# Patient Record
Sex: FEMALE | Race: WHITE | Employment: OTHER | URBAN - METROPOLITAN AREA
[De-identification: names, ages, dates, MRNs, and addresses within clinical notes are randomized per-mention and may not be internally consistent; named-entity substitution may affect disease eponyms.]

---

## 2022-09-13 ENCOUNTER — OFFICE VISIT (OUTPATIENT)
Dept: ORTHOPEDIC SURGERY | Age: 82
End: 2022-09-13
Payer: MEDICARE

## 2022-09-13 VITALS — WEIGHT: 190 LBS | HEIGHT: 62 IN | BODY MASS INDEX: 34.96 KG/M2

## 2022-09-13 DIAGNOSIS — M17.12 OSTEOARTHRITIS OF LEFT KNEE, UNSPECIFIED OSTEOARTHRITIS TYPE: ICD-10-CM

## 2022-09-13 DIAGNOSIS — M25.562 LEFT KNEE PAIN, UNSPECIFIED CHRONICITY: ICD-10-CM

## 2022-09-13 DIAGNOSIS — M25.562 LEFT KNEE PAIN, UNSPECIFIED CHRONICITY: Primary | ICD-10-CM

## 2022-09-13 PROCEDURE — 1101F PT FALLS ASSESS-DOCD LE1/YR: CPT | Performed by: ORTHOPAEDIC SURGERY

## 2022-09-13 PROCEDURE — G8400 PT W/DXA NO RESULTS DOC: HCPCS | Performed by: ORTHOPAEDIC SURGERY

## 2022-09-13 PROCEDURE — G8432 DEP SCR NOT DOC, RNG: HCPCS | Performed by: ORTHOPAEDIC SURGERY

## 2022-09-13 PROCEDURE — 1123F ACP DISCUSS/DSCN MKR DOCD: CPT | Performed by: ORTHOPAEDIC SURGERY

## 2022-09-13 PROCEDURE — G8427 DOCREV CUR MEDS BY ELIG CLIN: HCPCS | Performed by: ORTHOPAEDIC SURGERY

## 2022-09-13 PROCEDURE — 1090F PRES/ABSN URINE INCON ASSESS: CPT | Performed by: ORTHOPAEDIC SURGERY

## 2022-09-13 PROCEDURE — G8417 CALC BMI ABV UP PARAM F/U: HCPCS | Performed by: ORTHOPAEDIC SURGERY

## 2022-09-13 PROCEDURE — G8536 NO DOC ELDER MAL SCRN: HCPCS | Performed by: ORTHOPAEDIC SURGERY

## 2022-09-13 PROCEDURE — 20611 DRAIN/INJ JOINT/BURSA W/US: CPT | Performed by: ORTHOPAEDIC SURGERY

## 2022-09-13 PROCEDURE — 99214 OFFICE O/P EST MOD 30 MIN: CPT | Performed by: ORTHOPAEDIC SURGERY

## 2022-09-13 RX ORDER — THYROID, PORCINE 30 MG/1
TABLET ORAL
COMMUNITY
Start: 2022-08-01

## 2022-09-13 RX ORDER — ALBUTEROL SULFATE 90 UG/1
AEROSOL, METERED RESPIRATORY (INHALATION)
COMMUNITY
Start: 2022-07-25

## 2022-09-13 RX ORDER — EZETIMIBE 10 MG/1
TABLET ORAL
COMMUNITY
Start: 2022-06-29

## 2022-09-13 RX ORDER — BENZONATATE 200 MG/1
CAPSULE ORAL
COMMUNITY
Start: 2022-07-25

## 2022-09-13 NOTE — LETTER
Georgiana Erin   1940   [id number]       9/13/2022       I hereby authorize and direct Pascual Velasquez MD, Deepti Conley, and whomever he may designate as his associate to perform upon myself the following procedure:    Injection of: 1st Orthovisc lt knee rm 7   If any unforeseen condition arises in the course of the procedure, I further authorize him and his associated and/or assistant(s) to do whatever he/she deems advisable. The nature, purpose, benefits, risks, side effects, likelihood of achieving goals, and potential problems that might occur during recuperation, risks for not receiving the proposed care, treatment and services and alternatives of the procedure have been fully explained to me by my physician including, but not limited to:    Swelling, joint pain, skin pigment changes, worsening of condition, and failure to improve. I acknowledge that no guarantee or assurance has been made to me as to the results that may be obtained or the likelihood of success.                 _______________________________________     Signature of patient or authorized representative                [employer] [facility] fax: [phone]

## 2022-09-13 NOTE — PATIENT INSTRUCTIONS
Knee Arthritis: Care Instructions  Your Care Instructions     Knee arthritis is a breakdown of the cartilage that cushions your knee joint. When the cartilage wears down, your bones rub against each other. This causes pain and stiffness. Knee arthritis tends to get worse with time. Treatment for knee arthritis involves reducing pain, making the leg muscles stronger, and staying at a healthy body weight. The treatment usually does not improve the health of the cartilage, but it can reduce pain and improve how well your knee works. You can take simple measures to protect your knee joints, ease your pain, and help you stay active. Follow-up care is a key part of your treatment and safety. Be sure to make and go to all appointments, and call your doctor if you are having problems. It's also a good idea to know your test results and keep a list of the medicines you take. How can you care for yourself at home? Know that knee arthritis will cause more pain on some days than on others. Stay at a healthy weight. Lose weight if you are overweight. When you stand up, the pressure on your knees from every pound of body weight is multiplied four times. So if you lose 10 pounds, you will reduce the pressure on your knees by 40 pounds. Talk to your doctor or physical therapist about exercises that will help ease joint pain. Stretch to help prevent stiffness and to prevent injury before you exercise. You may enjoy gentle forms of yoga to help keep your knee joints and muscles flexible. Walk instead of jog. Ride a bike. This makes your thigh muscles stronger and takes pressure off your knee. Wear well-fitting and comfortable shoes. Exercise in chest-deep water. This can help you exercise longer with less pain. Avoid exercises that include squatting or kneeling. They can put a lot of strain on your knees. Talk to your doctor to make sure that the exercise you do is not making the arthritis worse.   Do not sit for long periods of time. Try to walk once in a while to keep your knee from getting stiff. Ask your doctor or physical therapist whether shoe inserts may reduce your arthritis pain. If you can afford it, get new athletic shoes at least every year. This can help reduce the strain on your knees. Use a device to help you do everyday activities. A cane or walking stick can help you keep your balance when you walk. Hold the cane or walking stick in the hand opposite the painful knee. If you feel like you may fall when you walk, try using crutches or a front-wheeled walker. These can prevent falls that could cause more damage to your knee. A knee brace may help keep your knee stable and prevent pain. You also can use other things to make life easier, such as a higher toilet seat and handrails in the bathtub or shower. Take pain medicines exactly as directed. Do not wait until you are in severe pain. You will get better results if you take it sooner. If you are not taking a prescription pain medicine, take an over-the-counter medicine such as acetaminophen (Tylenol), ibuprofen (Advil, Motrin), or naproxen (Aleve). Read and follow all instructions on the label. Do not take two or more pain medicines at the same time unless the doctor told you to. Many pain medicines have acetaminophen, which is Tylenol. Too much acetaminophen (Tylenol) can be harmful. Tell your doctor if you take a blood thinner, have diabetes, or have allergies to shellfish. Ask your doctor if you might benefit from a shot of steroid medicine into your knee. This may provide pain relief for several months. Many people take the supplements glucosamine and chondroitin for osteoarthritis. Some people feel they help, but the medical research does not show that they work. Talk to your doctor before you take these supplements. When should you call for help?    Call your doctor now or seek immediate medical care if:    You have sudden swelling, warmth, or pain in your knee. You have knee pain and a fever or rash. You have such bad pain that you cannot use your knee. Watch closely for changes in your health, and be sure to contact your doctor if you have any problems. Where can you learn more? Go to http://www.gray.com/  Enter W187 in the search box to learn more about \"Knee Arthritis: Care Instructions. \"  Current as of: December 20, 2021               Content Version: 13.2  © 2006-2022 [employer]. Care instructions adapted under license by [employer] (which disclaims liability or warranty for this information). If you have questions about a medical condition or this instruction, always ask your healthcare professional. [address] any warranty or liability for your use of this information.

## 2022-09-13 NOTE — PROGRESS NOTES
Name: Tricia Fuentes    : 1940     Service Dept: [address] [place] [address] Sports Medicine    Chief Complaint   Patient presents with    Knee Pain        Visit Vitals  Ht 5' 2\" (1.575 m)   Wt 190 lb (86.2 kg)   BMI 34.75 kg/m²        Allergies   Allergen Reactions    Other Food Other (comments)     Celiac food allergies causes disease flare-up    Lactose Other (comments)     Celiac disease flare-up    No Known Drug Allergies Other (comments)        Current Outpatient Medications   Medication Sig Dispense Refill    albuterol (PROVENTIL HFA, VENTOLIN HFA, PROAIR HFA) 90 mcg/actuation inhaler       benzonatate (TESSALON) 200 mg capsule       ezetimibe (ZETIA) 10 mg tablet       Warren Thyroid 30 mg tablet       aspirin delayed-release 81 mg tablet Take 81 mg by mouth daily. magnesium 100 mg cap Take  by mouth. 4 tabs daily      cholecalciferol, VITAMIN D3, (VITAMIN D3) 5,000 unit tab tablet Take  by mouth daily. omega-3 fatty acids (FISH OIL) cap Take  by mouth. Fish oil 700-4 tabs daily      multivitamin (ONE A DAY) tablet Take 1 tablet by mouth daily. 6 tabs daily-physician prescribed      B.infantis-B.ani-B.long-B.bifi (PROBIOTIC 4X) 10-15 mg TbEC Take  by mouth. 4 tabs daily       Current Facility-Administered Medications   Medication Dose Route Frequency Provider Last Rate Last Admin    [COMPLETED] sodium hyaluronate (viscosup) (ORTHOVISC) 30 mg/2 mL injection syrg 30 mg  30 mg Intra artICUlar ONCE Eual Pascual Jeffers MD   30 mg at 22 1600      There is no problem list on file for this patient.      Family History   Problem Relation Age of Onset    Celiac Disease Mother     Celiac Disease Maternal Aunt     Celiac Disease Daughter     Celiac Disease Grandchild     Celiac Disease Maternal Aunt       Social History     Socioeconomic History    Marital status:    Tobacco Use    Smoking status: Never    Smokeless tobacco: Never   Substance and Sexual Activity    Alcohol use: No      Past Surgical History:   Procedure Laterality Date    HX APPENDECTOMY      HX GYN  1968    tubular pregnancy    HX OTHER SURGICAL  2009    colonscopy    [place] BREAST SURGERY PROCEDURE UNLISTED Right     benign lumpectomy      Past Medical History:   Diagnosis Date    Arthritis     Borderline diabetes mellitus     Celiac disease     Diverticulosis     History of diverticulitis of colon     Lactose intolerance     Thyroid disease     hypothyroid    Unspecified sleep apnea     cpap        I have reviewed and agree with [address] [facility] and ROS and intake form in chart and the record furthermore I have reviewed prior medical record(s) regarding this patients care during this appointment. Review of Systems:   Patient is a pleasant appearing individual, appropriately dressed, well hydrated, well nourished, who is alert, appropriately oriented for age, and in no acute distress with a normal gait and normal affect who does not appear to be in any significant pain. Physical Exam:  Left Knee -Decrease range of motion with flexion, Knee arc of greater than 50 degrees, Some crepitation, Grossly neurovascularly intact, Good cap refill, No skin lesion, Moderate swelling, some gross instability, Some quadriceps weakness, Kellgren and Mingo at least grade 3    Right Knee - Full Range of Motion, No crepitation, Grossly neurovascularly intact, Good cap refill, No skin lesion, No swelling, No gross instability, No quadriceps weakness     Procedure Documentation:    I discussed in detail the risks, benefits and complications of an injection which included but are not limited to infection, skin reactions, hot swollen joint, and anaphylaxis with the patient. The patient verbalized understanding and gave informed consent for the injection. The patient's left knee were flexed to 90° and the skin prepped using sterile alcohol solution.  A sterile needle was inserted into left knee and Orthovisc 15 mg, 2 mL syringe was injected under sterile technique. The needle was withdrawn and the puncture site sealed with a Band-Aid. Technique: Under sterile conditions a GE ultrasound unit with a variable frequency (7.0-14.0 MHz) linear transducer was used to localize the placement of needle into the left knee joint. Findings: Successful needle placement for knee injection. Final images were taken and saved for permanent record. The patient tolerated the injection well. The patient was instructed to call the office immediately if there is any pain, redness, warmth, fever, or chills. Encounter Diagnoses     ICD-10-CM ICD-9-CM   1. Left knee pain, unspecified chronicity  M25.562 719.46   2. Osteoarthritis of left knee, unspecified osteoarthritis type  M17.12 715.96       HPI:  The patient is here with a chief complaint of left knee pain, progressively getting worse. She has the most pain when doing a lot of walking. Patient has had cortisone injections in the past with no relief and has tried treatment with NSAIDs and activity modification. Pain is 5/10. X-rays done here today in the office are positive for severe OA, bone-on-bone arthritis. Assessment/Plan:  Plan will be for left knee replacement, general medical and cardiac clearance. No history of blood clots, does not take any blood thinners, possible inpatient stay, and we will do Orthovisc first one today in the meantime for some immediate relief and go from there. As part of continued conservative pain management options the patient was advised to utilize Tylenol or OTC NSAIDS as long as it is not medically contraindicated. Return to Office: Follow-up and Dispositions    Return for schedule for surgery.         Administrations This Visit       sodium hyaluronate (viscosup) (ORTHOVISC) 30 mg/2 mL injection syrg 30 mg       Admin Date  09/13/2022 Action  Given Dose  30 mg Route  Intra artICUlar Administered By  Shoaib Hess LPN                   Scribed by Keenan Alvarado Iona Schuster as dictated by [facility] ROMAINE Haley MD.  Documentation True and Accepted [facility] ROMAINE Haley MD

## 2022-09-20 ENCOUNTER — OFFICE VISIT (OUTPATIENT)
Dept: ORTHOPEDIC SURGERY | Age: 82
End: 2022-09-20
Payer: MEDICARE

## 2022-09-20 DIAGNOSIS — M25.562 LEFT KNEE PAIN, UNSPECIFIED CHRONICITY: Primary | ICD-10-CM

## 2022-09-20 DIAGNOSIS — M17.12 OSTEOARTHRITIS OF LEFT KNEE, UNSPECIFIED OSTEOARTHRITIS TYPE: ICD-10-CM

## 2022-09-20 PROCEDURE — 20611 DRAIN/INJ JOINT/BURSA W/US: CPT | Performed by: ORTHOPAEDIC SURGERY

## 2022-09-20 NOTE — PROGRESS NOTES
Name: Shorty Crump    : 1940     Service Dept: [address] [place] [address] and Sports Medicine    Chief Complaint   Patient presents with    Injection    Knee Pain        There were no vitals taken for this visit. Allergies   Allergen Reactions    Other Food Other (comments)     Celiac food allergies causes disease flare-up    Lactose Other (comments)     Celiac disease flare-up    No Known Drug Allergies Other (comments)        Current Outpatient Medications   Medication Sig Dispense Refill    albuterol (PROVENTIL HFA, VENTOLIN HFA, PROAIR HFA) 90 mcg/actuation inhaler       benzonatate (TESSALON) 200 mg capsule       ezetimibe (ZETIA) 10 mg tablet       Sod Thyroid 30 mg tablet       aspirin delayed-release 81 mg tablet Take 81 mg by mouth daily. magnesium 100 mg cap Take  by mouth. 4 tabs daily      cholecalciferol, VITAMIN D3, (VITAMIN D3) 5,000 unit tab tablet Take  by mouth daily. omega-3 fatty acids (FISH OIL) cap Take  by mouth. Fish oil 700-4 tabs daily      multivitamin (ONE A DAY) tablet Take 1 tablet by mouth daily. 6 tabs daily-physician prescribed      B.infantis-B.ani-B.long-B.bifi (PROBIOTIC 4X) 10-15 mg TbEC Take  by mouth. 4 tabs daily       Current Facility-Administered Medications   Medication Dose Route Frequency Provider Last Rate Last Admin    [COMPLETED] sodium hyaluronate (viscosup) (ORTHOVISC) 30 mg/2 mL injection syrg 30 mg  30 mg Intra artICUlar ONCE Pascual Anne MD   30 mg at 22 1600      There is no problem list on file for this patient.      Family History   Problem Relation Age of Onset    Celiac Disease Mother     Celiac Disease Maternal Aunt     Celiac Disease Daughter     Celiac Disease Grandchild     Celiac Disease Maternal Aunt       Social History     Socioeconomic History    Marital status:    Tobacco Use    Smoking status: Never    Smokeless tobacco: Never   Substance and Sexual Activity    Alcohol use: No      Past Surgical History:   Procedure Laterality Date    HX APPENDECTOMY      HX GYN  1968    tubular pregnancy    HX OTHER SURGICAL  2009    colonscopy    [place] BREAST SURGERY PROCEDURE UNLISTED Right     benign lumpectomy      Past Medical History:   Diagnosis Date    Arthritis     Borderline diabetes mellitus     Celiac disease     Diverticulosis     History of diverticulitis of colon     Lactose intolerance     Thyroid disease     hypothyroid    Unspecified sleep apnea     cpap        I have reviewed and agree with [address] [facility] and ROS and intake form in chart and the record furthermore I have reviewed prior medical record(s) regarding this patients care during this appointment. Review of Systems:   Patient is a pleasant appearing individual, appropriately dressed, well hydrated, well nourished, who is alert, appropriately oriented for age, and in no acute distress with a normal gait and normal affect who does not appear to be in any significant pain. Physical Exam:  Left Knee -Decrease range of motion with flexion, Knee arc of greater than 50 degrees, Some crepitation, Grossly neurovascularly intact, Good cap refill, No skin lesion, Moderate swelling, some gross instability, Some quadriceps weakness, Kellgren and Mingo at least grade 3    Right Knee - Full Range of Motion, No crepitation, Grossly neurovascularly intact, Good cap refill, No skin lesion, No swelling, No gross instability, No quadriceps weakness     Procedure Documentation:    I discussed in detail the risks, benefits and complications of an injection which included but are not limited to infection, skin reactions, hot swollen joint, and anaphylaxis with the patient. The patient verbalized understanding and gave informed consent for the injection. The patient's left knee were flexed to 90° and the skin prepped using sterile alcohol solution. A sterile needle was inserted into left knee and Orthovisc 15 mg, 2 mL syringe was injected under sterile technique. The needle was withdrawn and the puncture site sealed with a Band-Aid. Technique: Under sterile conditions a [employer] ultrasound unit with a variable frequency (7.0-14.0 MHz) linear transducer was used to localize the placement of needle into the left knee joint. Findings: Successful needle placement for knee injection. Final images were taken and saved for permanent record. The patient tolerated the injection well. The patient was instructed to call the office immediately if there is any pain, redness, warmth, fever, or chills. Encounter Diagnoses     ICD-10-CM ICD-9-CM   1. Left knee pain, unspecified chronicity  M25.562 719.46   2. Osteoarthritis of left knee, unspecified osteoarthritis type  M17.12 715.96       Assessment/Plan: We are going to go and inject the left knee with second Orthovisc. See the patient back in a week for third Orthovisc to the left knee. As part of continued conservative pain management options the patient was advised to utilize Tylenol or OTC NSAIDS as long as it is not medically contraindicated. Return to Office: Follow-up and Dispositions    Return in about 1 week (around 9/27/2022) for 3rd left knee orthovisc. Administrations This Visit       sodium hyaluronate (viscosup) (ORTHOVISC) 30 mg/2 mL injection syrg 30 mg       Admin Date  09/20/2022 Action  Given Dose  30 mg Route  Intra artICUlar Administered By  Ludin Jones LPN                   Scribed by Zoya Sharp LPN as dictated by [facility] ROMAINE Coleman MD.  Documentation True and Accepted Pascual ROMAINE Coleman MD

## 2022-09-20 NOTE — PATIENT INSTRUCTIONS
Knee Arthritis: Care Instructions  Your Care Instructions     Knee arthritis is a breakdown of the cartilage that cushions your knee joint. When the cartilage wears down, your bones rub against each other. This causes pain and stiffness. Knee arthritis tends to get worse with time. Treatment for knee arthritis involves reducing pain, making the leg muscles stronger, and staying at a healthy body weight. The treatment usually does not improve the health of the cartilage, but it can reduce pain and improve how well your knee works. You can take simple measures to protect your knee joints, ease your pain, and help you stay active. Follow-up care is a key part of your treatment and safety. Be sure to make and go to all appointments, and call your doctor if you are having problems. It's also a good idea to know your test results and keep a list of the medicines you take. How can you care for yourself at home? Know that knee arthritis will cause more pain on some days than on others. Stay at a healthy weight. Lose weight if you are overweight. When you stand up, the pressure on your knees from every pound of body weight is multiplied four times. So if you lose 10 pounds, you will reduce the pressure on your knees by 40 pounds. Talk to your doctor or physical therapist about exercises that will help ease joint pain. Stretch to help prevent stiffness and to prevent injury before you exercise. You may enjoy gentle forms of yoga to help keep your knee joints and muscles flexible. Walk instead of jog. Ride a bike. This makes your thigh muscles stronger and takes pressure off your knee. Wear well-fitting and comfortable shoes. Exercise in chest-deep water. This can help you exercise longer with less pain. Avoid exercises that include squatting or kneeling. They can put a lot of strain on your knees. Talk to your doctor to make sure that the exercise you do is not making the arthritis worse.   Do not sit for long periods of time. Try to walk once in a while to keep your knee from getting stiff. Ask your doctor or physical therapist whether shoe inserts may reduce your arthritis pain. If you can afford it, get new athletic shoes at least every year. This can help reduce the strain on your knees. Use a device to help you do everyday activities. A cane or walking stick can help you keep your balance when you walk. Hold the cane or walking stick in the hand opposite the painful knee. If you feel like you may fall when you walk, try using crutches or a front-wheeled walker. These can prevent falls that could cause more damage to your knee. A knee brace may help keep your knee stable and prevent pain. You also can use other things to make life easier, such as a higher toilet seat and handrails in the bathtub or shower. Take pain medicines exactly as directed. Do not wait until you are in severe pain. You will get better results if you take it sooner. If you are not taking a prescription pain medicine, take an over-the-counter medicine such as acetaminophen (Tylenol), ibuprofen (Advil, Motrin), or naproxen (Aleve). Read and follow all instructions on the label. Do not take two or more pain medicines at the same time unless the doctor told you to. Many pain medicines have acetaminophen, which is Tylenol. Too much acetaminophen (Tylenol) can be harmful. Tell your doctor if you take a blood thinner, have diabetes, or have allergies to shellfish. Ask your doctor if you might benefit from a shot of steroid medicine into your knee. This may provide pain relief for several months. Many people take the supplements glucosamine and chondroitin for osteoarthritis. Some people feel they help, but the medical research does not show that they work. Talk to your doctor before you take these supplements. When should you call for help?    Call your doctor now or seek immediate medical care if:    You have sudden swelling, warmth, or pain in your knee. You have knee pain and a fever or rash. You have such bad pain that you cannot use your knee. Watch closely for changes in your health, and be sure to contact your doctor if you have any problems. Where can you learn more? Go to http://www.gray.com/  Enter W187 in the search box to learn more about \"Knee Arthritis: Care Instructions. \"  Current as of: December 20, 2021               Content Version: 13.2  © 2006-2022 [employer]. Care instructions adapted under license by [employer] (which disclaims liability or warranty for this information). If you have questions about a medical condition or this instruction, always ask your healthcare professional. [address] any warranty or liability for your use of this information.

## 2022-09-20 NOTE — LETTER
Shawn Balloon   1940   [id number]       9/20/2022       I hereby authorize and direct Pascual Mejia MD, Carmen De La Cruz, and whomever he may designate as his associate to perform upon myself the following procedure:    Injection of: Orthovisc lt knee rm 1    If any unforeseen condition arises in the course of the procedure, I further authorize him and his associated and/or assistant(s) to do whatever he/she deems advisable. The nature, purpose, benefits, risks, side effects, likelihood of achieving goals, and potential problems that might occur during recuperation, risks for not receiving the proposed care, treatment and services and alternatives of the procedure have been fully explained to me by my physician including, but not limited to:    Swelling, joint pain, skin pigment changes, worsening of condition, and failure to improve. I acknowledge that no guarantee or assurance has been made to me as to the results that may be obtained or the likelihood of success.                 _______________________________________     Signature of patient or authorized representative                [employer] [facility] fax: [phone]

## 2022-09-27 ENCOUNTER — OFFICE VISIT (OUTPATIENT)
Dept: ORTHOPEDIC SURGERY | Age: 82
End: 2022-09-27
Payer: MEDICARE

## 2022-09-27 DIAGNOSIS — M25.562 LEFT KNEE PAIN, UNSPECIFIED CHRONICITY: ICD-10-CM

## 2022-09-27 DIAGNOSIS — M17.12 OSTEOARTHRITIS OF LEFT KNEE, UNSPECIFIED OSTEOARTHRITIS TYPE: Primary | ICD-10-CM

## 2022-09-27 PROCEDURE — 20611 DRAIN/INJ JOINT/BURSA W/US: CPT | Performed by: ORTHOPAEDIC SURGERY

## 2022-09-27 NOTE — PROGRESS NOTES
Name: Josselin     : 1940     Service Dept: [address] [place] [address] Sports Medicine    Chief Complaint   Patient presents with    Injection     Left Knee 3rd Orthovisc        There were no vitals taken for this visit. Allergies   Allergen Reactions    Other Food Other (comments)     Celiac food allergies causes disease flare-up    Lactose Other (comments)     Celiac disease flare-up    No Known Drug Allergies Other (comments)        Current Outpatient Medications   Medication Sig Dispense Refill    albuterol (PROVENTIL HFA, VENTOLIN HFA, PROAIR HFA) 90 mcg/actuation inhaler       benzonatate (TESSALON) 200 mg capsule       ezetimibe (ZETIA) 10 mg tablet       [place] Thyroid 30 mg tablet       aspirin delayed-release 81 mg tablet Take 81 mg by mouth daily. magnesium 100 mg cap Take  by mouth. 4 tabs daily      cholecalciferol, VITAMIN D3, (VITAMIN D3) 5,000 unit tab tablet Take  by mouth daily. omega-3 fatty acids (FISH OIL) cap Take  by mouth. Fish oil 700-4 tabs daily      multivitamin (ONE A DAY) tablet Take 1 tablet by mouth daily. 6 tabs daily-physician prescribed      B.infantis-B.ani-B.long-B.bifi (PROBIOTIC 4X) 10-15 mg TbEC Take  by mouth. 4 tabs daily       Current Facility-Administered Medications   Medication Dose Route Frequency Provider Last Rate Last Admin    [COMPLETED] sodium hyaluronate (viscosup) (ORTHOVISC) 30 mg/2 mL injection syrg 30 mg  30 mg Intra artICUlar ONCE Pascual Mcdonald MD   30 mg at 22 1600      There is no problem list on file for this patient. Family History   Problem Relation Age of Onset    Celiac Disease Mother     Celiac Disease Maternal Aunt     Celiac Disease Daughter     Celiac Disease Grandchild     Celiac Disease Maternal Aunt       Social History     Socioeconomic History    Marital status:    Tobacco Use    Smoking status: Never    Smokeless tobacco: Never   Substance and Sexual Activity    Alcohol use:  No Past Surgical History:   Procedure Laterality Date    HX APPENDECTOMY      HX GYN  1968    tubular pregnancy    HX OTHER SURGICAL  2009    colonscopy    [place] BREAST SURGERY PROCEDURE UNLISTED Right     benign lumpectomy      Past Medical History:   Diagnosis Date    Arthritis     Borderline diabetes mellitus     Celiac disease     Diverticulosis     History of diverticulitis of colon     Lactose intolerance     Thyroid disease     hypothyroid    Unspecified sleep apnea     cpap        I have reviewed and agree with [address] [facility] [address] and ROS and intake form in chart and the record furthermore I have reviewed prior medical record(s) regarding this patients care during this appointment. Review of Systems:   Patient is a pleasant appearing individual, appropriately dressed, well hydrated, well nourished, who is alert, appropriately oriented for age, and in no acute distress with a normal gait and normal affect who does not appear to be in any significant pain. Physical Exam:  Left Knee -Decrease range of motion with flexion, Knee arc of greater than 50 degrees, Some crepitation, Grossly neurovascularly intact, Good cap refill, No skin lesion, Moderate swelling, some gross instability, Some quadriceps weakness, Kellgren and Mingo at least grade 3    Right Knee - Full Range of Motion, No crepitation, Grossly neurovascularly intact, Good cap refill, No skin lesion, No swelling, No gross instability, No quadriceps weakness     Procedure Documentation:    I discussed in detail the risks, benefits and complications of an injection which included but are not limited to infection, skin reactions, hot swollen joint, and anaphylaxis with the patient. The patient verbalized understanding and gave informed consent for the injection. The patient's left knee were flexed to 90° and the skin prepped using sterile alcohol solution.  A sterile needle was inserted into left knee and Orthovisc 15 mg, 2 mL syringe was injected under sterile technique. The needle was withdrawn and the puncture site sealed with a Band-Aid. Technique: Under sterile conditions a [employer] ultrasound unit with a variable frequency (7.0-14.0 MHz) linear transducer was used to localize the placement of needle into the left knee joint. Findings: Successful needle placement for knee injection. Final images were taken and saved for permanent record. The patient tolerated the injection well. The patient was instructed to call the office immediately if there is any pain, redness, warmth, fever, or chills. Encounter Diagnoses     ICD-10-CM ICD-9-CM   1. Osteoarthritis of left knee, unspecified osteoarthritis type  M17.12 715.96   2. Left knee pain, unspecified chronicity  M25.562 719.46       Assessment/Plan: We are going to go and inject the left knee with third Orthovisc. See the patient back in a week for fourth Orthovisc to the left knee. As part of continued conservative pain management options the patient was advised to utilize Tylenol or OTC NSAIDS as long as it is not medically contraindicated. Return to Office: Follow-up and Dispositions    Return if symptoms worsen or fail to improve, for 4th left orthovisc. Administrations This Visit       sodium hyaluronate (viscosup) (ORTHOVISC) 30 mg/2 mL injection syrg 30 mg       Admin Date  09/27/2022 Action  Given Dose  30 mg Route  Intra artICUlar Administered By  Jennifer Brandon LPN                   Scribed by Beverly Nick LPN as dictated by [facility] [place] ROMAINE Brown MD.  Documentation True and Accepted Pascual Brown MD

## 2022-09-27 NOTE — PATIENT INSTRUCTIONS
Knee Arthritis: Care Instructions  Your Care Instructions     Knee arthritis is a breakdown of the cartilage that cushions your knee joint. When the cartilage wears down, your bones rub against each other. This causes pain and stiffness. Knee arthritis tends to get worse with time. Treatment for knee arthritis involves reducing pain, making the leg muscles stronger, and staying at a healthy body weight. The treatment usually does not improve the health of the cartilage, but it can reduce pain and improve how well your knee works. You can take simple measures to protect your knee joints, ease your pain, and help you stay active. Follow-up care is a key part of your treatment and safety. Be sure to make and go to all appointments, and call your doctor if you are having problems. It's also a good idea to know your test results and keep a list of the medicines you take. How can you care for yourself at home? Know that knee arthritis will cause more pain on some days than on others. Stay at a healthy weight. Lose weight if you are overweight. When you stand up, the pressure on your knees from every pound of body weight is multiplied four times. So if you lose 10 pounds, you will reduce the pressure on your knees by 40 pounds. Talk to your doctor or physical therapist about exercises that will help ease joint pain. Stretch to help prevent stiffness and to prevent injury before you exercise. You may enjoy gentle forms of yoga to help keep your knee joints and muscles flexible. Walk instead of jog. Ride a bike. This makes your thigh muscles stronger and takes pressure off your knee. Wear well-fitting and comfortable shoes. Exercise in chest-deep water. This can help you exercise longer with less pain. Avoid exercises that include squatting or kneeling. They can put a lot of strain on your knees. Talk to your doctor to make sure that the exercise you do is not making the arthritis worse.   Do not sit for long periods of time. Try to walk once in a while to keep your knee from getting stiff. Ask your doctor or physical therapist whether shoe inserts may reduce your arthritis pain. If you can afford it, get new athletic shoes at least every year. This can help reduce the strain on your knees. Use a device to help you do everyday activities. A cane or walking stick can help you keep your balance when you walk. Hold the cane or walking stick in the hand opposite the painful knee. If you feel like you may fall when you walk, try using crutches or a front-wheeled walker. These can prevent falls that could cause more damage to your knee. A knee brace may help keep your knee stable and prevent pain. You also can use other things to make life easier, such as a higher toilet seat and handrails in the bathtub or shower. Take pain medicines exactly as directed. Do not wait until you are in severe pain. You will get better results if you take it sooner. If you are not taking a prescription pain medicine, take an over-the-counter medicine such as acetaminophen (Tylenol), ibuprofen (Advil, Motrin), or naproxen (Aleve). Read and follow all instructions on the label. Do not take two or more pain medicines at the same time unless the doctor told you to. Many pain medicines have acetaminophen, which is Tylenol. Too much acetaminophen (Tylenol) can be harmful. Tell your doctor if you take a blood thinner, have diabetes, or have allergies to shellfish. Ask your doctor if you might benefit from a shot of steroid medicine into your knee. This may provide pain relief for several months. Many people take the supplements glucosamine and chondroitin for osteoarthritis. Some people feel they help, but the medical research does not show that they work. Talk to your doctor before you take these supplements. When should you call for help?    Call your doctor now or seek immediate medical care if:    You have sudden swelling, warmth, or pain in your knee. You have knee pain and a fever or rash. You have such bad pain that you cannot use your knee. Watch closely for changes in your health, and be sure to contact your doctor if you have any problems. Where can you learn more? Go to http://www.gray.com/  Enter W187 in the search box to learn more about \"Knee Arthritis: Care Instructions. \"  Current as of: December 20, 2021               Content Version: 13.2  © 2006-2022 [employer]. Care instructions adapted under license by [employer] (which disclaims liability or warranty for this information). If you have questions about a medical condition or this instruction, always ask your healthcare professional. [address] any warranty or liability for your use of this information.

## 2022-09-27 NOTE — LETTER
Joselynkarlane Check   1940   [id number]       9/27/2022       I hereby authorize and direct Pascual Duncan MD, Yecenia Slade, and whomever he may designate as his associate to perform upon myself the following procedure:    Injection of: Orthovisc LT KNEE    If any unforeseen condition arises in the course of the procedure, I further authorize him and his associated and/or assistant(s) to do whatever he/she deems advisable. The nature, purpose, benefits, risks, side effects, likelihood of achieving goals, and potential problems that might occur during recuperation, risks for not receiving the proposed care, treatment and services and alternatives of the procedure have been fully explained to me by my physician including, but not limited to:    Swelling, joint pain, skin pigment changes, worsening of condition, and failure to improve. I acknowledge that no guarantee or assurance has been made to me as to the results that may be obtained or the likelihood of success.                 _______________________________________     Signature of patient or authorized representative                [employer] [facility] fax: [phone]

## 2022-10-04 ENCOUNTER — OFFICE VISIT (OUTPATIENT)
Dept: ORTHOPEDIC SURGERY | Age: 82
End: 2022-10-04
Payer: MEDICARE

## 2022-10-04 DIAGNOSIS — M17.12 OSTEOARTHRITIS OF LEFT KNEE, UNSPECIFIED OSTEOARTHRITIS TYPE: Primary | ICD-10-CM

## 2022-10-04 DIAGNOSIS — M25.562 LEFT KNEE PAIN, UNSPECIFIED CHRONICITY: ICD-10-CM

## 2022-10-04 PROCEDURE — 20611 DRAIN/INJ JOINT/BURSA W/US: CPT | Performed by: ORTHOPAEDIC SURGERY

## 2022-10-04 NOTE — LETTER
Daniela Silvestre   1940   [id number]       10/4/2022       I hereby authorize and direct Pascual Price MD, Kaylyn Roblero, and whomever he may designate as his associate to perform upon myself the following procedure:    Injection of: 4th Orthovisc lt knee    If any unforeseen condition arises in the course of the procedure, I further authorize him and his associated and/or assistant(s) to do whatever he/she deems advisable. The nature, purpose, benefits, risks, side effects, likelihood of achieving goals, and potential problems that might occur during recuperation, risks for not receiving the proposed care, treatment and services and alternatives of the procedure have been fully explained to me by my physician including, but not limited to:    Swelling, joint pain, skin pigment changes, worsening of condition, and failure to improve. I acknowledge that no guarantee or assurance has been made to me as to the results that may be obtained or the likelihood of success.                 _______________________________________     Signature of patient or authorized representative                [employer] [facility] fax: [phone]

## 2022-10-04 NOTE — PATIENT INSTRUCTIONS
Knee Arthritis: Care Instructions  Your Care Instructions     Knee arthritis is a breakdown of the cartilage that cushions your knee joint. When the cartilage wears down, your bones rub against each other. This causes pain and stiffness. Knee arthritis tends to get worse with time. Treatment for knee arthritis involves reducing pain, making the leg muscles stronger, and staying at a healthy body weight. The treatment usually does not improve the health of the cartilage, but it can reduce pain and improve how well your knee works. You can take simple measures to protect your knee joints, ease your pain, and help you stay active. Follow-up care is a key part of your treatment and safety. Be sure to make and go to all appointments, and call your doctor if you are having problems. It's also a good idea to know your test results and keep a list of the medicines you take. How can you care for yourself at home? Know that knee arthritis will cause more pain on some days than on others. Stay at a healthy weight. Lose weight if you are overweight. When you stand up, the pressure on your knees from every pound of body weight is multiplied four times. So if you lose 10 pounds, you will reduce the pressure on your knees by 40 pounds. Talk to your doctor or physical therapist about exercises that will help ease joint pain. Stretch to help prevent stiffness and to prevent injury before you exercise. You may enjoy gentle forms of yoga to help keep your knee joints and muscles flexible. Walk instead of jog. Ride a bike. This makes your thigh muscles stronger and takes pressure off your knee. Wear well-fitting and comfortable shoes. Exercise in chest-deep water. This can help you exercise longer with less pain. Avoid exercises that include squatting or kneeling. They can put a lot of strain on your knees. Talk to your doctor to make sure that the exercise you do is not making the arthritis worse.   Do not sit for long periods of time. Try to walk once in a while to keep your knee from getting stiff. Ask your doctor or physical therapist whether shoe inserts may reduce your arthritis pain. If you can afford it, get new athletic shoes at least every year. This can help reduce the strain on your knees. Use a device to help you do everyday activities. A cane or walking stick can help you keep your balance when you walk. Hold the cane or walking stick in the hand opposite the painful knee. If you feel like you may fall when you walk, try using crutches or a front-wheeled walker. These can prevent falls that could cause more damage to your knee. A knee brace may help keep your knee stable and prevent pain. You also can use other things to make life easier, such as a higher toilet seat and handrails in the bathtub or shower. Take pain medicines exactly as directed. Do not wait until you are in severe pain. You will get better results if you take it sooner. If you are not taking a prescription pain medicine, take an over-the-counter medicine such as acetaminophen (Tylenol), ibuprofen (Advil, Motrin), or naproxen (Aleve). Read and follow all instructions on the label. Do not take two or more pain medicines at the same time unless the doctor told you to. Many pain medicines have acetaminophen, which is Tylenol. Too much acetaminophen (Tylenol) can be harmful. Tell your doctor if you take a blood thinner, have diabetes, or have allergies to shellfish. Ask your doctor if you might benefit from a shot of steroid medicine into your knee. This may provide pain relief for several months. Many people take the supplements glucosamine and chondroitin for osteoarthritis. Some people feel they help, but the medical research does not show that they work. Talk to your doctor before you take these supplements. When should you call for help?    Call your doctor now or seek immediate medical care if:    You have sudden swelling, warmth, or pain in your knee. You have knee pain and a fever or rash. You have such bad pain that you cannot use your knee. Watch closely for changes in your health, and be sure to contact your doctor if you have any problems. Where can you learn more? Go to http://www.gray.com/  Enter W187 in the search box to learn more about \"Knee Arthritis: Care Instructions. \"  Current as of: December 20, 2021               Content Version: 13.2  © 2006-2022 [employer]. Care instructions adapted under license by [employer] (which disclaims liability or warranty for this information). If you have questions about a medical condition or this instruction, always ask your healthcare professional. [address] any warranty or liability for your use of this information.

## 2022-10-04 NOTE — PROGRESS NOTES
Name: Augie Suazo    : 1940     Service Dept: [address] [place] [address] Medicine    Chief Complaint   Patient presents with    Injection     4th Orthovisc        There were no vitals taken for this visit. Allergies   Allergen Reactions    Other Food Other (comments)     Celiac food allergies causes disease flare-up    Lactose Other (comments)     Celiac disease flare-up    No Known Drug Allergies Other (comments)        Current Outpatient Medications   Medication Sig Dispense Refill    albuterol (PROVENTIL HFA, VENTOLIN HFA, PROAIR HFA) 90 mcg/actuation inhaler       benzonatate (TESSALON) 200 mg capsule       ezetimibe (ZETIA) 10 mg tablet       [place] Thyroid 30 mg tablet       aspirin delayed-release 81 mg tablet Take 81 mg by mouth daily. magnesium 100 mg cap Take  by mouth. 4 tabs daily      cholecalciferol, VITAMIN D3, (VITAMIN D3) 5,000 unit tab tablet Take  by mouth daily. omega-3 fatty acids (FISH OIL) cap Take  by mouth. Fish oil 700-4 tabs daily      multivitamin (ONE A DAY) tablet Take 1 tablet by mouth daily. 6 tabs daily-physician prescribed      B.infantis-B.ani-B.long-B.bifi (PROBIOTIC 4X) 10-15 mg TbEC Take  by mouth. 4 tabs daily       Current Facility-Administered Medications   Medication Dose Route Frequency Provider Last Rate Last Admin    [COMPLETED] sodium hyaluronate (viscosup) (ORTHOVISC) 30 mg/2 mL injection syrg 30 mg  30 mg Intra artICUlar ONCE Pascual Stokes MD   30 mg at 10/04/22 1600      There is no problem list on file for this patient.      Family History   Problem Relation Age of Onset    Celiac Disease Mother     Celiac Disease Maternal Aunt     Celiac Disease Daughter     Celiac Disease Grandchild     Celiac Disease Maternal Aunt       Social History     Socioeconomic History    Marital status:    Tobacco Use    Smoking status: Never    Smokeless tobacco: Never   Substance and Sexual Activity    Alcohol use: No      Past Surgical History:   Procedure Laterality Date    HX APPENDECTOMY      HX GYN  1968    tubular pregnancy    HX OTHER SURGICAL  2009    colonscopy    [place] BREAST SURGERY PROCEDURE UNLISTED Right     benign lumpectomy      Past Medical History:   Diagnosis Date    Arthritis     Borderline diabetes mellitus     Celiac disease     Diverticulosis     History of diverticulitis of colon     Lactose intolerance     Thyroid disease     hypothyroid    Unspecified sleep apnea     cpap        I have reviewed and agree with [address] [facility] [address] and ROS and intake form in chart and the record furthermore I have reviewed prior medical record(s) regarding this patients care during this appointment. Review of Systems:   Patient is a pleasant appearing individual, appropriately dressed, well hydrated, well nourished, who is alert, appropriately oriented for age, and in no acute distress with a normal gait and normal affect who does not appear to be in any significant pain. Physical Exam:  Left Knee -Decrease range of motion with flexion, Knee arc of greater than 50 degrees, Some crepitation, Grossly neurovascularly intact, Good cap refill, No skin lesion, Moderate swelling, some gross instability, Some quadriceps weakness, Kellgren and Mingo at least grade 3    Right Knee - Full Range of Motion, No crepitation, Grossly neurovascularly intact, Good cap refill, No skin lesion, No swelling, No gross instability, No quadriceps weakness     Procedure Documentation:    I discussed in detail the risks, benefits and complications of an injection which included but are not limited to infection, skin reactions, hot swollen joint, and anaphylaxis with the patient. The patient verbalized understanding and gave informed consent for the injection. The patient's left knee were flexed to 90° and the skin prepped using sterile alcohol solution. A sterile needle was inserted into left knee and Orthovisc 15 mg, 2 mL syringe was injected under sterile technique. The needle was withdrawn and the puncture site sealed with a Band-Aid. Technique: Under sterile conditions a [employer] ultrasound unit with a variable frequency (7.0-14.0 MHz) linear transducer was used to localize the placement of needle into the left knee joint. Findings: Successful needle placement for knee injection. Final images were taken and saved for permanent record. The patient tolerated the injection well. The patient was instructed to call the office immediately if there is any pain, redness, warmth, fever, or chills. Encounter Diagnoses     ICD-10-CM ICD-9-CM   1. Osteoarthritis of left knee, unspecified osteoarthritis type  M17.12 715.96   2. Left knee pain, unspecified chronicity  M25.562 719.46       HPI:  The patient is here with a chief complaint of left knee pain. Assessment/Plan: We are going to go ahead and inject left knee with fourth Orthovisc. See the patient back as needed and go from there. As part of continued conservative pain management options the patient was advised to utilize Tylenol or OTC NSAIDS as long as it is not medically contraindicated. Return to Office: Follow-up and Dispositions    Return if symptoms worsen or fail to improve. Administrations This Visit       sodium hyaluronate (viscosup) (ORTHOVISC) 30 mg/2 mL injection syrg 30 mg       Admin Date  10/04/2022 Action  Given Dose  30 mg Route  Intra artICUlar Administered By  Willy Spencer LPN                   Scribed by Lee Mobley LPN as dictated by [facility] [place] ROMAINE Galo MD.  Documentation True and Accepted Pascual Galo MD

## 2023-01-24 ENCOUNTER — HOSPITAL ENCOUNTER (OUTPATIENT)
Dept: LAB | Age: 83
Discharge: HOME | End: 2023-01-24
Payer: MEDICARE

## 2023-01-24 ENCOUNTER — HOSPITAL ENCOUNTER (OUTPATIENT)
Dept: GENERAL RADIOLOGY | Age: 83
End: 2023-01-24
Payer: MEDICARE

## 2023-01-24 ENCOUNTER — HOSPITAL ENCOUNTER (OUTPATIENT)
Dept: NON INVASIVE DIAGNOSTICS | Age: 83
End: 2023-01-24
Payer: MEDICARE

## 2023-01-24 DIAGNOSIS — M25.562 LEFT KNEE PAIN, UNSPECIFIED CHRONICITY: ICD-10-CM

## 2023-01-24 DIAGNOSIS — M17.12 OSTEOARTHRITIS OF LEFT KNEE, UNSPECIFIED OSTEOARTHRITIS TYPE: ICD-10-CM

## 2023-01-24 LAB
ANION GAP SERPL CALC-SCNC: 7 MMOL/L (ref 5–15)
BASOPHILS # BLD: 0.1 K/UL (ref 0–0.1)
BASOPHILS NFR BLD: 1 % (ref 0–1)
BUN SERPL-MCNC: 24 MG/DL (ref 6–20)
BUN/CREAT SERPL: 24 (ref 12–20)
CA-I BLD-MCNC: 9.1 MG/DL (ref 8.5–10.1)
CHLORIDE SERPL-SCNC: 107 MMOL/L (ref 97–108)
CO2 SERPL-SCNC: 24 MMOL/L (ref 21–32)
CREAT SERPL-MCNC: 1.01 MG/DL (ref 0.55–1.02)
DIFFERENTIAL METHOD BLD: NORMAL
EOSINOPHIL # BLD: 0.1 K/UL (ref 0–0.4)
EOSINOPHIL NFR BLD: 2 % (ref 0–7)
ERYTHROCYTE [DISTWIDTH] IN BLOOD BY AUTOMATED COUNT: 14 % (ref 11.5–14.5)
GLUCOSE SERPL-MCNC: 119 MG/DL (ref 65–100)
HCT VFR BLD AUTO: 41.6 % (ref 35–47)
HGB BLD-MCNC: 13.9 G/DL (ref 11.5–16)
IMM GRANULOCYTES # BLD AUTO: 0 K/UL (ref 0–0.04)
IMM GRANULOCYTES NFR BLD AUTO: 0 % (ref 0–0.5)
LYMPHOCYTES # BLD: 2.4 K/UL (ref 0.8–3.5)
LYMPHOCYTES NFR BLD: 35 % (ref 12–49)
MCH RBC QN AUTO: 31.4 PG (ref 26–34)
MCHC RBC AUTO-ENTMCNC: 33.4 G/DL (ref 30–36.5)
MCV RBC AUTO: 93.9 FL (ref 80–99)
MONOCYTES # BLD: 0.7 K/UL (ref 0–1)
MONOCYTES NFR BLD: 10 % (ref 5–13)
MRSA DNA SPEC QL NAA+PROBE: NOT DETECTED
NEUTS SEG # BLD: 3.6 K/UL (ref 1.8–8)
NEUTS SEG NFR BLD: 52 % (ref 32–75)
NRBC # BLD: 0 K/UL (ref 0–0.01)
NRBC BLD-RTO: 0 PER 100 WBC
PLATELET # BLD AUTO: 193 K/UL (ref 150–400)
PMV BLD AUTO: 10.7 FL (ref 8.9–12.9)
POTASSIUM SERPL-SCNC: 4.2 MMOL/L (ref 3.5–5.1)
RBC # BLD AUTO: 4.43 M/UL (ref 3.8–5.2)
SODIUM SERPL-SCNC: 138 MMOL/L (ref 136–145)
WBC # BLD AUTO: 6.9 K/UL (ref 3.6–11)

## 2023-01-24 PROCEDURE — 85025 COMPLETE CBC W/AUTO DIFF WBC: CPT

## 2023-01-24 PROCEDURE — 93005 ELECTROCARDIOGRAM TRACING: CPT

## 2023-01-24 PROCEDURE — 71046 X-RAY EXAM CHEST 2 VIEWS: CPT

## 2023-01-24 PROCEDURE — 36415 COLL VENOUS BLD VENIPUNCTURE: CPT

## 2023-01-24 PROCEDURE — 80048 BASIC METABOLIC PNL TOTAL CA: CPT

## 2023-01-24 PROCEDURE — 87641 MR-STAPH DNA AMP PROBE: CPT

## 2023-01-25 LAB
ATRIAL RATE: 82 BPM
CALCULATED P AXIS, ECG09: 57 DEGREES
CALCULATED R AXIS, ECG10: 13 DEGREES
CALCULATED T AXIS, ECG11: 43 DEGREES
DIAGNOSIS, 93000: NORMAL
P-R INTERVAL, ECG05: 172 MS
Q-T INTERVAL, ECG07: 374 MS
QRS DURATION, ECG06: 70 MS
QTC CALCULATION (BEZET), ECG08: 436 MS
VENTRICULAR RATE, ECG03: 82 BPM

## 2023-03-20 RX ORDER — MAGNESIUM OXIDE 400 MG/1
400 TABLET ORAL DAILY
COMMUNITY

## 2023-03-20 RX ORDER — VITAMIN B COMPLEX
1 TABLET ORAL DAILY
COMMUNITY

## 2023-03-20 RX ORDER — ZINC GLUCONATE 50 MG
50 TABLET ORAL DAILY
COMMUNITY

## 2023-03-27 DIAGNOSIS — M17.12 UNILATERAL PRIMARY OSTEOARTHRITIS, LEFT KNEE: ICD-10-CM

## 2023-03-27 DIAGNOSIS — M25.562 LEFT KNEE PAIN, UNSPECIFIED CHRONICITY: Primary | ICD-10-CM

## 2023-04-05 DIAGNOSIS — Z96.652 STATUS POST TOTAL LEFT KNEE REPLACEMENT: Primary | ICD-10-CM

## 2023-04-06 ENCOUNTER — OFFICE VISIT (OUTPATIENT)
Age: 83
End: 2023-04-06

## 2023-04-06 VITALS — BODY MASS INDEX: 35.7 KG/M2 | HEIGHT: 62 IN | WEIGHT: 194 LBS

## 2023-04-06 DIAGNOSIS — M25.562 LEFT KNEE PAIN, UNSPECIFIED CHRONICITY: ICD-10-CM

## 2023-04-06 DIAGNOSIS — M17.12 UNILATERAL PRIMARY OSTEOARTHRITIS, LEFT KNEE: Primary | ICD-10-CM

## 2023-04-06 RX ORDER — ALBUTEROL SULFATE 90 UG/1
AEROSOL, METERED RESPIRATORY (INHALATION)
COMMUNITY

## 2023-04-06 RX ORDER — DOXYCYCLINE HYCLATE 100 MG/1
CAPSULE ORAL
COMMUNITY

## 2023-04-06 RX ORDER — BENZONATATE 200 MG/1
CAPSULE ORAL
COMMUNITY

## 2023-04-06 RX ORDER — ONDANSETRON 8 MG/1
8 TABLET, ORALLY DISINTEGRATING ORAL EVERY 8 HOURS PRN
Qty: 20 TABLET | Refills: 0 | Status: SHIPPED | OUTPATIENT
Start: 2023-04-06 | End: 2023-04-13

## 2023-04-06 RX ORDER — OXYCODONE HYDROCHLORIDE AND ACETAMINOPHEN 5; 325 MG/1; MG/1
1 TABLET ORAL
Qty: 30 TABLET | Refills: 0 | Status: SHIPPED | OUTPATIENT
Start: 2023-04-06 | End: 2023-04-14

## 2023-04-06 RX ORDER — CEPHALEXIN 500 MG/1
500 CAPSULE ORAL EVERY 8 HOURS
Qty: 9 CAPSULE | Refills: 0 | Status: SHIPPED | OUTPATIENT
Start: 2023-04-06 | End: 2023-04-09

## 2023-04-06 NOTE — PROGRESS NOTES
SURGERY. Scribed by Evelin Nava LPN as dictated by [facility] NAVIN Guzmán MD.  Documentation, performed by, True and Accepted Kalia Guzmán MD

## 2023-04-06 NOTE — PATIENT INSTRUCTIONS
You have knee pain and a fever or rash. You have such bad pain that you cannot use your knee. Watch closely for changes in your health, and be sure to contact your doctor if you have any problems. Where can you learn more? Go to http://www.woods.com/ and enter W187 to learn more about \"Knee Arthritis: Care Instructions. \"  Current as of: March 9, 2022               Content Version: 13.5  © 2006-2022 [employer]. Care instructions adapted under license by [facility] [address] If you have questions about a medical condition or this instruction, always ask your healthcare professional. [place] [address] any warranty or liability for your use of this information.

## 2023-04-13 ENCOUNTER — APPOINTMENT (OUTPATIENT)
Age: 83
End: 2023-04-13
Attending: ORTHOPAEDIC SURGERY
Payer: MEDICARE

## 2023-04-13 ENCOUNTER — HOSPITAL ENCOUNTER (OUTPATIENT)
Age: 83
Setting detail: OBSERVATION
Discharge: HOME | End: 2023-04-14
Attending: ORTHOPAEDIC SURGERY | Admitting: INTERNAL MEDICINE
Payer: MEDICARE

## 2023-04-13 PROBLEM — Z96.652 TOTAL KNEE REPLACEMENT STATUS, LEFT: Status: ACTIVE | Noted: 2023-04-13

## 2023-04-13 PROCEDURE — 7100000001 HC PACU RECOVERY - ADDTL 15 MIN: Performed by: ORTHOPAEDIC SURGERY

## 2023-04-13 PROCEDURE — 2720000010 HC SURG SUPPLY STERILE: Performed by: ORTHOPAEDIC SURGERY

## 2023-04-13 PROCEDURE — 6360000002 HC RX W HCPCS: Performed by: NURSE PRACTITIONER

## 2023-04-13 PROCEDURE — 7100000011 HC PHASE II RECOVERY - ADDTL 15 MIN: Performed by: ORTHOPAEDIC SURGERY

## 2023-04-13 PROCEDURE — 3600000005 HC SURGERY LEVEL 5 BASE: Performed by: ORTHOPAEDIC SURGERY

## 2023-04-13 PROCEDURE — 7100000000 HC PACU RECOVERY - FIRST 15 MIN: Performed by: ORTHOPAEDIC SURGERY

## 2023-04-13 PROCEDURE — 7100000010 HC PHASE II RECOVERY - FIRST 15 MIN: Performed by: ORTHOPAEDIC SURGERY

## 2023-04-13 PROCEDURE — 96375 TX/PRO/DX INJ NEW DRUG ADDON: CPT

## 2023-04-13 PROCEDURE — 6370000000 HC RX 637 (ALT 250 FOR IP): Performed by: NURSE PRACTITIONER

## 2023-04-13 PROCEDURE — 2580000003 HC RX 258: Performed by: NURSE PRACTITIONER

## 2023-04-13 PROCEDURE — 97116 GAIT TRAINING THERAPY: CPT

## 2023-04-13 PROCEDURE — 6370000000 HC RX 637 (ALT 250 FOR IP): Performed by: NURSE ANESTHETIST, CERTIFIED REGISTERED

## 2023-04-13 PROCEDURE — C1776 JOINT DEVICE (IMPLANTABLE): HCPCS | Performed by: ORTHOPAEDIC SURGERY

## 2023-04-13 PROCEDURE — 2580000003 HC RX 258: Performed by: ORTHOPAEDIC SURGERY

## 2023-04-13 PROCEDURE — 3600000015 HC SURGERY LEVEL 5 ADDTL 15MIN: Performed by: ORTHOPAEDIC SURGERY

## 2023-04-13 PROCEDURE — 97162 PT EVAL MOD COMPLEX 30 MIN: CPT

## 2023-04-13 PROCEDURE — C1713 ANCHOR/SCREW BN/BN,TIS/BN: HCPCS | Performed by: ORTHOPAEDIC SURGERY

## 2023-04-13 PROCEDURE — 2709999900 HC NON-CHARGEABLE SUPPLY: Performed by: ORTHOPAEDIC SURGERY

## 2023-04-13 PROCEDURE — 3700000000 HC ANESTHESIA ATTENDED CARE: Performed by: ORTHOPAEDIC SURGERY

## 2023-04-13 PROCEDURE — G0378 HOSPITAL OBSERVATION PER HR: HCPCS

## 2023-04-13 PROCEDURE — 3700000001 HC ADD 15 MINUTES (ANESTHESIA): Performed by: ORTHOPAEDIC SURGERY

## 2023-04-13 PROCEDURE — 73560 X-RAY EXAM OF KNEE 1 OR 2: CPT

## 2023-04-13 PROCEDURE — 2500000003 HC RX 250 WO HCPCS: Performed by: ORTHOPAEDIC SURGERY

## 2023-04-13 DEVICE — KNEE K1 TOT HEMI STD CEM IMPL CAPPED SYNTHES: Type: IMPLANTABLE DEVICE | Site: KNEE | Status: FUNCTIONAL

## 2023-04-13 DEVICE — CEMENT BONE 40 GM W/ GENTMYCN HI VISC PALACOS R+G: Type: IMPLANTABLE DEVICE | Site: KNEE | Status: FUNCTIONAL

## 2023-04-13 DEVICE — ATTUNE KNEE SYSTEM FEMORAL POSTERIOR STABILIZED NARROW SIZE 5N LEFT CEMENTED
Type: IMPLANTABLE DEVICE | Site: KNEE | Status: FUNCTIONAL
Brand: ATTUNE

## 2023-04-13 DEVICE — ATTUNE KNEE SYSTEM TIBIAL INSERT ROTATING PLATFORM POSTERIOR STABILIZED 5 15MM AOX
Type: IMPLANTABLE DEVICE | Site: KNEE | Status: FUNCTIONAL
Brand: ATTUNE

## 2023-04-13 DEVICE — ATTUNE PATELLA MEDIALIZED DOME 38MM CEMENTED AOX
Type: IMPLANTABLE DEVICE | Site: KNEE | Status: FUNCTIONAL
Brand: ATTUNE

## 2023-04-13 DEVICE — ATTUNE KNEE SYSTEM TIBIAL BASE ROTATING PLATFORM SIZE 4 CEMENTED
Type: IMPLANTABLE DEVICE | Site: KNEE | Status: FUNCTIONAL
Brand: ATTUNE

## 2023-04-13 RX ORDER — SODIUM CHLORIDE 0.9 % (FLUSH) 0.9 %
5-40 SYRINGE (ML) INJECTION PRN
Status: DISCONTINUED | OUTPATIENT
Start: 2023-04-13 | End: 2023-04-14 | Stop reason: HOSPADM

## 2023-04-13 RX ORDER — GABAPENTIN 300 MG/1
300 CAPSULE ORAL ONCE
Status: COMPLETED | OUTPATIENT
Start: 2023-04-13 | End: 2023-04-13

## 2023-04-13 RX ORDER — PROCHLORPERAZINE EDISYLATE 5 MG/ML
5 INJECTION INTRAMUSCULAR; INTRAVENOUS
Status: DISCONTINUED | OUTPATIENT
Start: 2023-04-13 | End: 2023-04-13

## 2023-04-13 RX ORDER — SODIUM CHLORIDE 0.9 % (FLUSH) 0.9 %
5-40 SYRINGE (ML) INJECTION EVERY 12 HOURS SCHEDULED
Status: DISCONTINUED | OUTPATIENT
Start: 2023-04-13 | End: 2023-04-14 | Stop reason: HOSPADM

## 2023-04-13 RX ORDER — KETOROLAC TROMETHAMINE 30 MG/ML
15 INJECTION, SOLUTION INTRAMUSCULAR; INTRAVENOUS EVERY 6 HOURS
Status: DISCONTINUED | OUTPATIENT
Start: 2023-04-13 | End: 2023-04-13

## 2023-04-13 RX ORDER — OXYCODONE HYDROCHLORIDE 5 MG/1
5 TABLET ORAL EVERY 4 HOURS PRN
Status: DISCONTINUED | OUTPATIENT
Start: 2023-04-13 | End: 2023-04-14 | Stop reason: HOSPADM

## 2023-04-13 RX ORDER — MIDAZOLAM HYDROCHLORIDE 1 MG/ML
2 INJECTION INTRAMUSCULAR; INTRAVENOUS
Status: DISCONTINUED | OUTPATIENT
Start: 2023-04-13 | End: 2023-04-13

## 2023-04-13 RX ORDER — HALOPERIDOL 5 MG/ML
1 INJECTION INTRAMUSCULAR
Status: DISCONTINUED | OUTPATIENT
Start: 2023-04-13 | End: 2023-04-13

## 2023-04-13 RX ORDER — SODIUM CHLORIDE, SODIUM LACTATE, POTASSIUM CHLORIDE, CALCIUM CHLORIDE 600; 310; 30; 20 MG/100ML; MG/100ML; MG/100ML; MG/100ML
INJECTION, SOLUTION INTRAVENOUS CONTINUOUS
Status: DISCONTINUED | OUTPATIENT
Start: 2023-04-13 | End: 2023-04-13

## 2023-04-13 RX ORDER — FENTANYL CITRATE 50 UG/ML
50 INJECTION, SOLUTION INTRAMUSCULAR; INTRAVENOUS EVERY 5 MIN PRN
Status: DISCONTINUED | OUTPATIENT
Start: 2023-04-13 | End: 2023-04-13

## 2023-04-13 RX ORDER — DIPHENHYDRAMINE HYDROCHLORIDE 50 MG/ML
25 INJECTION INTRAMUSCULAR; INTRAVENOUS EVERY 6 HOURS PRN
Status: DISCONTINUED | OUTPATIENT
Start: 2023-04-13 | End: 2023-04-14 | Stop reason: HOSPADM

## 2023-04-13 RX ORDER — OXYCODONE HYDROCHLORIDE 10 MG/1
10 TABLET ORAL EVERY 4 HOURS PRN
Status: DISCONTINUED | OUTPATIENT
Start: 2023-04-13 | End: 2023-04-14 | Stop reason: HOSPADM

## 2023-04-13 RX ORDER — BUPIVACAINE HYDROCHLORIDE AND EPINEPHRINE 2.5; 5 MG/ML; UG/ML
INJECTION, SOLUTION INFILTRATION; PERINEURAL PRN
Status: DISCONTINUED | OUTPATIENT
Start: 2023-04-13 | End: 2023-04-13 | Stop reason: ALTCHOICE

## 2023-04-13 RX ORDER — DIPHENHYDRAMINE HYDROCHLORIDE 50 MG/ML
12.5 INJECTION INTRAMUSCULAR; INTRAVENOUS
Status: DISCONTINUED | OUTPATIENT
Start: 2023-04-13 | End: 2023-04-13

## 2023-04-13 RX ORDER — CELECOXIB 200 MG/1
200 CAPSULE ORAL ONCE
Status: COMPLETED | OUTPATIENT
Start: 2023-04-13 | End: 2023-04-13

## 2023-04-13 RX ORDER — SODIUM CHLORIDE 0.9 % (FLUSH) 0.9 %
5-40 SYRINGE (ML) INJECTION EVERY 12 HOURS SCHEDULED
Status: DISCONTINUED | OUTPATIENT
Start: 2023-04-13 | End: 2023-04-13

## 2023-04-13 RX ORDER — ACETAMINOPHEN 325 MG/1
650 TABLET ORAL EVERY 6 HOURS
Status: DISCONTINUED | OUTPATIENT
Start: 2023-04-13 | End: 2023-04-14 | Stop reason: HOSPADM

## 2023-04-13 RX ORDER — ONDANSETRON 2 MG/ML
4 INJECTION INTRAMUSCULAR; INTRAVENOUS EVERY 6 HOURS PRN
Status: DISCONTINUED | OUTPATIENT
Start: 2023-04-13 | End: 2023-04-14 | Stop reason: HOSPADM

## 2023-04-13 RX ORDER — KETOROLAC TROMETHAMINE 30 MG/ML
15 INJECTION, SOLUTION INTRAMUSCULAR; INTRAVENOUS EVERY 6 HOURS
Status: COMPLETED | OUTPATIENT
Start: 2023-04-13 | End: 2023-04-14

## 2023-04-13 RX ORDER — SODIUM CHLORIDE, SODIUM LACTATE, POTASSIUM CHLORIDE, CALCIUM CHLORIDE 600; 310; 30; 20 MG/100ML; MG/100ML; MG/100ML; MG/100ML
INJECTION, SOLUTION INTRAVENOUS CONTINUOUS
Status: DISCONTINUED | OUTPATIENT
Start: 2023-04-13 | End: 2023-04-14 | Stop reason: HOSPADM

## 2023-04-13 RX ORDER — DIPHENHYDRAMINE HCL 25 MG
25 TABLET ORAL EVERY 6 HOURS PRN
Status: DISCONTINUED | OUTPATIENT
Start: 2023-04-13 | End: 2023-04-14 | Stop reason: HOSPADM

## 2023-04-13 RX ORDER — LABETALOL HYDROCHLORIDE 5 MG/ML
10 INJECTION, SOLUTION INTRAVENOUS
Status: DISCONTINUED | OUTPATIENT
Start: 2023-04-13 | End: 2023-04-13

## 2023-04-13 RX ORDER — ONDANSETRON 4 MG/1
4 TABLET, ORALLY DISINTEGRATING ORAL EVERY 8 HOURS PRN
Status: DISCONTINUED | OUTPATIENT
Start: 2023-04-13 | End: 2023-04-14 | Stop reason: HOSPADM

## 2023-04-13 RX ORDER — ACETAMINOPHEN 500 MG
1000 TABLET ORAL ONCE
Status: COMPLETED | OUTPATIENT
Start: 2023-04-13 | End: 2023-04-13

## 2023-04-13 RX ORDER — SODIUM CHLORIDE 0.9 % (FLUSH) 0.9 %
5-40 SYRINGE (ML) INJECTION PRN
Status: DISCONTINUED | OUTPATIENT
Start: 2023-04-13 | End: 2023-04-13

## 2023-04-13 RX ADMIN — GABAPENTIN 300 MG: 300 CAPSULE ORAL at 07:28

## 2023-04-13 RX ADMIN — ACETAMINOPHEN 650 MG: 325 TABLET ORAL at 13:11

## 2023-04-13 RX ADMIN — KETOROLAC TROMETHAMINE 15 MG: 30 INJECTION, SOLUTION INTRAMUSCULAR; INTRAVENOUS at 21:37

## 2023-04-13 RX ADMIN — ACETAMINOPHEN 1000 MG: 500 TABLET ORAL at 07:28

## 2023-04-13 RX ADMIN — OXYCODONE HYDROCHLORIDE 10 MG: 10 TABLET ORAL at 19:59

## 2023-04-13 RX ADMIN — ACETAMINOPHEN 650 MG: 325 TABLET ORAL at 18:12

## 2023-04-13 RX ADMIN — Medication 10 ML: at 21:38

## 2023-04-13 RX ADMIN — OXYCODONE HYDROCHLORIDE 10 MG: 10 TABLET ORAL at 15:37

## 2023-04-13 RX ADMIN — KETOROLAC TROMETHAMINE 15 MG: 30 INJECTION, SOLUTION INTRAMUSCULAR; INTRAVENOUS at 14:42

## 2023-04-13 RX ADMIN — CEFAZOLIN 2000 MG: 2 INJECTION, POWDER, FOR SOLUTION INTRAMUSCULAR; INTRAVENOUS at 18:12

## 2023-04-13 RX ADMIN — CELECOXIB 200 MG: 200 CAPSULE ORAL at 07:28

## 2023-04-13 RX ADMIN — SODIUM CHLORIDE, POTASSIUM CHLORIDE, SODIUM LACTATE AND CALCIUM CHLORIDE: 600; 310; 30; 20 INJECTION, SOLUTION INTRAVENOUS at 07:29

## 2023-04-13 ASSESSMENT — PAIN - FUNCTIONAL ASSESSMENT: PAIN_FUNCTIONAL_ASSESSMENT: NONE - DENIES PAIN

## 2023-04-13 ASSESSMENT — PAIN DESCRIPTION - LOCATION
LOCATION: KNEE

## 2023-04-13 ASSESSMENT — PAIN SCALES - GENERAL
PAINLEVEL_OUTOF10: 7
PAINLEVEL_OUTOF10: 7
PAINLEVEL_OUTOF10: 6

## 2023-04-13 ASSESSMENT — PAIN DESCRIPTION - ORIENTATION
ORIENTATION: LEFT
ORIENTATION: LEFT

## 2023-04-13 ASSESSMENT — PAIN DESCRIPTION - DESCRIPTORS: DESCRIPTORS: ACHING;OTHER (COMMENT)

## 2023-04-13 NOTE — THERAPY EVALUATION
PHYSICAL THERAPY EVALUATION    Patient: Braxton Moreno (34 y.o. female)  Date: 4/13/2023  Physical Therapy Time:   PT Individual Minutes  Time In: 4899  Time Out: 1147  Minutes: 45  Timed Minute Breakdown:  PTE: 30       GT: 15       Primary Diagnosis: Primary osteoarthritis of left knee [M17.12]  Total knee replacement status, left [Z96.652] Total knee replacement status, left  Present on Admission:   Total knee replacement status, left   Procedure(s) (LRB):  LEFT TKA (OBSERVATION) (Left) Day of Surgery   Precautions:    Restrictions/Precautions  Restrictions/Precautions: Weight Bearing Lower Extremity Weight Bearing Restrictions  Left Lower Extremity Weight Bearing: Weight Bearing As Tolerated    Discharge Recommendations: Outpatient PT    Conditions Requiring skilled therapeutic intervention:    L TKA    Evaluation Activity Tolerance:   Activity Tolerance  Activity Tolerance: Patient tolerated evaluation without incident;Patient limited by endurance    Equipment Recommendations for Discharge:   na    AM-PAC   24; Current research shows that an AM-PAC score of 17 or less is typically not associated with a discharge to the patient's home setting, whereas a score of 18 or greater is typically associated with a discharge to the patient's home setting. Factors which may influence rehabilitation potential include:  No factors noted     PLAN :   DC home with outpatient PT     SUBJECTIVE:     See below    OBJECTIVE DATA SUMMARY:     Past Medical History:   Diagnosis Date    Hyperlipidemia      Past Surgical History:   Procedure Laterality Date    ECTOPIC PREGNANCY SURGERY Right     JOINT REPLACEMENT Right     Rt. TKA    TOTAL KNEE ARTHROPLASTY Left 4/13/2023    LEFT TKA (OBSERVATION) performed by Rhett Ambrose MD at [facility] MAIN OR       Home Situation:  Social/Functional History  Lives With: Spouse  Type of Home: House    Vitals:   WNL    Orientation and Cognition:   Orientation  Overall Orientation Status: Within

## 2023-04-13 NOTE — DISCHARGE INSTRUCTIONS
TOTAL KNEE REPLACEMENT DISCHARGE INFORMATION    You have undergone a Total Knee Replacement. The following list is to provide you with some expectations over the next week upon your discharge from the hospital.     Please begin Aspirin 325mg every 12 hours (twice daily) starting tomorrow as directed until Dr. Yuliya Barone instructs you to discontinue it. If you are not sure which blood thinner to take please contact Dr. Alana Livingston office next business day for clarification. Please be sure to continue your thigh-high compression stockings on both sides until instructed to discontinue them. Over the course of the next week, you should continue thigh high stockings on the operative leg, DO NOT GET THE INCISION WET until instructed to do so. Please make sure the stockings on the operative leg are pulled up all the way to the thigh to prevent any creases which may result in abrasions or creases in the skin. If the stockings are creating creases resulting in abrasions or blistering on the operative leg please remove the stockings. You may take the stockings off on the nonoperative leg once you arrive home. You may notice some bruising on your thigh and it may extend all the way to the ankles. That is perfectly normal early on. You may experience a clicking noise in your knee and that is normal because of the artificial knee. It is important to remember if you have any surgical procedure including dental procedures which may result in bleeding that an antibiotic 1 hour before the procedure will be required. Please let the provider performing the procedure know that you have artificial joint. If an antibiotic is not given by them please call our office and give us at least 5 business days to get you the appropriate antibiotics if needed. This rule applies indefinitely. If an Ace wrap is placed on your knee you may remove the Ace wrap only 48 hours after your surgery. We will leave the stockings on.   During the course

## 2023-04-13 NOTE — PROGRESS NOTES
1220- Assumed care of pt. Received report from [facility]. Pt VSS and admission assessment completed. Pt requesting lunch tray and cup of ice water. Pt instructed on use of call bell. 1250- Pt set up with lunch tray. Pt has no current requests. Family at bedside. 1311- Scheduled tylenol given. Pt tolerated well with small sips of water. Pt pedal pulses and swelling assessed. Pedal pulses strong and equal bilaterally +3. Pt has minimal edema in legs, non pitting. Pt awaiting PT. Call bell left within reach. 1400- PT in to see patient. Pt tolerated ambulation well. Expressed small amount of dizziness near the end of ambulation, but no drop in blood pressure. Pt ambulated to the toilet with the physical therapist during the session and urinated. Pt back in bed resting quietly with the call bell in reach and family at bedside. 1442- Scheduled Toradol given. Pt IV flushed before and after medication administration. Pt educated on upcoming dose of IV antibiotic this evening. Pt has no complaints or requests at this time. 1545- Pt calling the nurses station complaining of pain and requesting some pain medication. Pt urinated on herself and stated that she needed to urinate more in the restroom. Pt ambulated to the restroom well and urinated in the toilet. Pt redressed into dry clothing. Bedding changed. Pt ambulated back to bed and has no complaints at this time. Call bell left within reach, family at bedside. 1608- Pt voices relief and states that she feels \"much more comfortable\". Pt attempting to rest.     1717- Pt resting in the room with family. Pt expresses relief but states that her leg still \"feels a little achy\". Pt notified of time she can receive the next prn medication and verbalizes understanding. Pt trying to rest. Call bell left within reach. 1730- Pt ambulating in the jones with family. 1740- Pt back in bed resting quietly. 1812- IV ancef started. Scheduled tylenol given.

## 2023-04-13 NOTE — INTERVAL H&P NOTE
Update History & Physical    The Patient's History and Physical was reviewed with the patient. The patient was examined. There was no change. The surgical site was confirmed by the patient and me. Patient understands and wants to proceed with the procedure. If applicable, I have discussed with the patient / power of  the rationale for blood component transfusion; its benefits in treating or preventing fatigue, organ damage, or death; and its risk which includes mild transfusion reactions, rare risk of blood borne infection, or more serious but rare reactions. I have discussed the alternatives to transfusion, including the risk and consequences of not receiving transfusion. The patient / Jesus Michele of  had an opportunity to ask questions and had agreed to proceed with transfusion of blood components. Plan:  The risk, benefits, expected outcome, and alternative to the recommended procedure have been discussed with the patient.

## 2023-04-13 NOTE — H&P
History and Physical    Subjective:     Darlyn Lui is a [de-identified] y.o. with a past medical history for osteoarthritis, hyperlipidemia, and obstructive sleep apnea with CPAP use, patient presented to the facility today to undergo a left total knee arthroplasty with Dr. Yuliya Barone. Hospital medicine consulted for medication management and overnight observation. Patient assessed at the bedside, patient is alert and oriented x3, there are no acute signs and symptoms of distress. Patient denies chest pain, shortness of breath, nausea, vomiting, diarrhea. Patient at this time has border line low BP, but she asymptomatic denies dizziness and headaches. Patient agrees to admission for overnight observation, with plans to discharge home in the morning. Admit patient to medical surgical unit for observation. Past Medical History:   Diagnosis Date    Hyperlipidemia       Past Surgical History:   Procedure Laterality Date    ECTOPIC PREGNANCY SURGERY Right     JOINT REPLACEMENT Right     Rt. TKA     History reviewed. No pertinent family history. Social History     Tobacco Use    Smoking status: Never    Smokeless tobacco: Never   Substance Use Topics    Alcohol use: Not on file       Prior to Admission medications    Medication Sig Start Date End Date Taking?  Authorizing Provider   zinc gluconate 50 MG tablet Take 1 tablet by mouth daily   Yes Historical Provider, MD   Probiotic Product (PROBIOTIC ADVANCED PO) Take 1 tablet by mouth daily   Yes Historical Provider, MD   magnesium oxide (MAG-OX) 400 MG tablet Take 1 tablet by mouth daily   Yes Historical Provider, MD   GARLIC PO Take 1 tablet by mouth daily   Yes Historical Provider, MD   Coenzyme Q10 (COQ10) 100 MG CAPS Take 1 tablet by mouth daily   Yes Historical Provider, MD   albuterol sulfate HFA (PROVENTIL;VENTOLIN;PROAIR) 108 (90 Base) MCG/ACT inhaler albuterol sulfate HFA 90 mcg/actuation aerosol inhaler    Historical Provider, MD   benzonatate (TESSALON) 200

## 2023-04-13 NOTE — PROGRESS NOTES
Nutrition Note    RDN discussed dietary needs with patient 2/2 Gluten intolerance. Reviewed the menu, probed for knowledge gaps, and provided information to the kitchen as to what she can and cannot receive with her intolerance. Author left contact in case questions related to gluten free diet come up.     Electronically signed by Nigel Lopez on 4/13/23 at 3:51 PM EDT    Contact: [phone]

## 2023-04-13 NOTE — OP NOTE
[id number] Device identifier type: GS1      [employer] Information       Request status Waiting for response                As of 4/13/2023       Status: Implanted                      Baseplate Tib Sz 4 Knee Rot Platfrm Prashant Sys Attune - [id number] - Implanted   (Left) Knee      Inventory item: BASEPLATE TIB SZ 4 KNEE ROT PLATFRM PRASHANT SYS ATTUNE Model/Cat number: [id number]    : [employer]- Lot number: [id number]    Device identifier: [id number] Device identifier type: GS1      GUDID Information       Request status Waiting for response                As of 4/13/2023       Status: Implanted                      Component Pat Kpu54vi Polyeth Dome Prashant Medialized Attune - [id number] - Implanted   (Left) Knee      Inventory item: COMPONENT PAT [id number] POLYETH DOME PRASHANT MEDIALIZED ATTUNE Model/Cat number: [id number]    : [employer] Lot number: [id number]    Device identifier: [id number] Device identifier type: [facility] [facility] Information       Request status Waiting for response                As of 4/13/2023       Status: Implanted                               Operative procedure: Total knee replacement    OPERATIVE PROCEDURE:  Please note the first assistant role was to help in patient positioning and draping of the extremity in a sterile fashion. Also during the surgery the assistant's responsibilities included but not limited to extremity positioning during critical portions of the surgery. Assisting in using and placement of retractors during surgery. Lower extremity was prepped and draped in a sterile fashion. After adequate anesthesia was given, the patient was placed in a well-padded supine position. Subvastus arthrotomy from the tibial tubercle to the superior pole of the patella was made. Knee was hyperflexed. Intramedullary reaming of distal femur and proximal tibia was performed. 10 mm of distal femur was cut.   Anterior-posterior sizing

## 2023-04-14 VITALS
HEART RATE: 84 BPM | OXYGEN SATURATION: 98 % | RESPIRATION RATE: 16 BRPM | TEMPERATURE: 97.2 F | WEIGHT: 195 LBS | DIASTOLIC BLOOD PRESSURE: 54 MMHG | HEIGHT: 62 IN | SYSTOLIC BLOOD PRESSURE: 123 MMHG | BODY MASS INDEX: 35.88 KG/M2

## 2023-04-14 LAB
ANION GAP SERPL CALC-SCNC: 5 MMOL/L (ref 3–18)
BUN SERPL-MCNC: 21 MG/DL (ref 7–18)
BUN/CREAT SERPL: 26 (ref 12–20)
CA-I BLD-MCNC: 8.5 MG/DL (ref 8.5–10.1)
CHLORIDE SERPL-SCNC: 108 MMOL/L (ref 100–111)
CO2 SERPL-SCNC: 26 MMOL/L (ref 21–32)
CREAT SERPL-MCNC: 0.8 MG/DL (ref 0.6–1.3)
ERYTHROCYTE [DISTWIDTH] IN BLOOD BY AUTOMATED COUNT: 14 % (ref 11.6–14.5)
GLUCOSE SERPL-MCNC: 181 MG/DL (ref 74–99)
HCT VFR BLD AUTO: 33.8 % (ref 35–45)
HGB BLD-MCNC: 11.5 G/DL (ref 12–16)
MCH RBC QN AUTO: 32.7 PG (ref 24–34)
MCHC RBC AUTO-ENTMCNC: 34 G/DL (ref 31–37)
MCV RBC AUTO: 96 FL (ref 78–100)
NRBC # BLD: 0 K/UL (ref 0–0.01)
NRBC BLD-RTO: 0 PER 100 WBC
PLATELET # BLD AUTO: 154 K/UL (ref 135–420)
PMV BLD AUTO: 11.4 FL (ref 9.2–11.8)
POTASSIUM SERPL-SCNC: 3.9 MMOL/L (ref 3.5–5.5)
RBC # BLD AUTO: 3.52 M/UL (ref 4.2–5.3)
SODIUM SERPL-SCNC: 139 MMOL/L (ref 136–145)
WBC # BLD AUTO: 9.1 K/UL (ref 4.6–13.2)

## 2023-04-14 PROCEDURE — 97116 GAIT TRAINING THERAPY: CPT

## 2023-04-14 PROCEDURE — 36415 COLL VENOUS BLD VENIPUNCTURE: CPT

## 2023-04-14 PROCEDURE — 2580000003 HC RX 258: Performed by: NURSE PRACTITIONER

## 2023-04-14 PROCEDURE — 6360000002 HC RX W HCPCS: Performed by: NURSE PRACTITIONER

## 2023-04-14 PROCEDURE — 97110 THERAPEUTIC EXERCISES: CPT

## 2023-04-14 PROCEDURE — 96376 TX/PRO/DX INJ SAME DRUG ADON: CPT

## 2023-04-14 PROCEDURE — 96365 THER/PROPH/DIAG IV INF INIT: CPT

## 2023-04-14 PROCEDURE — G0378 HOSPITAL OBSERVATION PER HR: HCPCS

## 2023-04-14 PROCEDURE — 80048 BASIC METABOLIC PNL TOTAL CA: CPT

## 2023-04-14 PROCEDURE — 85027 COMPLETE CBC AUTOMATED: CPT

## 2023-04-14 PROCEDURE — 6370000000 HC RX 637 (ALT 250 FOR IP): Performed by: NURSE PRACTITIONER

## 2023-04-14 RX ADMIN — OXYCODONE HYDROCHLORIDE 10 MG: 10 TABLET ORAL at 00:39

## 2023-04-14 RX ADMIN — Medication 10 ML: at 08:06

## 2023-04-14 RX ADMIN — KETOROLAC TROMETHAMINE 15 MG: 30 INJECTION, SOLUTION INTRAMUSCULAR; INTRAVENOUS at 03:13

## 2023-04-14 RX ADMIN — OXYCODONE HYDROCHLORIDE 10 MG: 10 TABLET ORAL at 05:05

## 2023-04-14 RX ADMIN — OXYCODONE HYDROCHLORIDE 10 MG: 10 TABLET ORAL at 09:09

## 2023-04-14 RX ADMIN — ASPIRIN 325 MG: 325 TABLET, COATED ORAL at 08:06

## 2023-04-14 RX ADMIN — ACETAMINOPHEN 650 MG: 325 TABLET ORAL at 00:39

## 2023-04-14 RX ADMIN — CEFAZOLIN 2000 MG: 2 INJECTION, POWDER, FOR SOLUTION INTRAMUSCULAR; INTRAVENOUS at 01:56

## 2023-04-14 ASSESSMENT — PAIN SCALES - GENERAL
PAINLEVEL_OUTOF10: 1
PAINLEVEL_OUTOF10: 6
PAINLEVEL_OUTOF10: 7
PAINLEVEL_OUTOF10: 6
PAINLEVEL_OUTOF10: 6

## 2023-04-14 ASSESSMENT — PAIN DESCRIPTION - ORIENTATION
ORIENTATION: LEFT

## 2023-04-14 ASSESSMENT — PAIN DESCRIPTION - LOCATION
LOCATION: KNEE

## 2023-04-14 ASSESSMENT — PAIN DESCRIPTION - DESCRIPTORS
DESCRIPTORS: ACHING
DESCRIPTORS: ACHING

## 2023-04-14 NOTE — DISCHARGE SUMMARY
Discharge Summary       PATIENT ID: Iris Grullon  MRN: [id number]   YOB: 1940    DATE OF ADMISSION: 4/13/2023  6:39 AM    DATE OF DISCHARGE: 04/14/23    PRIMARY CARE PROVIDER: Perla Ortiz DO     ATTENDING PHYSICIAN: Abilio Pascual MD  DISCHARGING PROVIDER: Abilio Pascual MD        CONSULTATIONS: IP CONSULT TO ORTHOPEDIC SURGERY  IP CONSULT TO RESPIRATORY CARE    PROCEDURES/SURGERIES: Procedure(s) with comments:  LEFT TKA (OBSERVATION) - left adductor canal block    ADMITTING [address] [place] [address] COURSE:   Iris Grullon is a [de-identified] y.o. with a past medical history for osteoarthritis, hyperlipidemia, and obstructive sleep apnea with CPAP use, patient presented to the facility today to undergo a left total knee arthroplasty with Dr. Meli John. Hospital medicine consulted for medication management and overnight observation. Patient assessed at the bedside, patient is alert and oriented x3, there are no acute signs and symptoms of distress. Patient denies chest pain, shortness of breath, nausea, vomiting, diarrhea. Patient at this time has border line low BP, but she asymptomatic denies dizziness and headaches. Patient agrees to admission for overnight observation, with plans to discharge home in the morning. Admit patient to medical surgical unit for observation.          DISCHARGE DIAGNOSES / PLAN:      Assessment & Plan:      Osteoarthritis  -status post left TKA  -pain management  -continue scheduled antibiotics  -ice pack, knee brace, and SCDs  -PT consulted      Hyperlipidemia  -diet controlled, patient stopped taking Zetia due to side effects     Obstructive Sleep Apnea  -continue use of CPAP, which is at bedside    Day of dc  No cp, no sob, urinating well, pain controlled      DISCHARGE MEDICATIONS:  Current Discharge Medication List        CONTINUE these medications which have NOT CHANGED    Details   zinc gluconate 50 MG tablet Take 1 tablet by mouth daily      Probiotic Product

## 2023-04-14 NOTE — PLAN OF CARE
Problem: ABCDS Injury Assessment  Goal: Absence of physical injury  4/14/2023 0948 by Damaso Srinivasan RN  Outcome: Progressing  4/14/2023 0253 by Godwin Orellana RN  Outcome: Progressing     Problem: Discharge Planning  Goal: Discharge to home or other facility with appropriate resources  4/14/2023 0253 by Godwin Orellana RN  Outcome: Progressing  Flowsheets (Taken 4/13/2023 1957)  Discharge to home or other facility with appropriate resources: Identify barriers to discharge with patient and caregiver     Problem: Pain  Goal: Verbalizes/displays adequate comfort level or baseline comfort level  4/14/2023 0253 by Godwin Orellana RN  Outcome: Progressing  Flowsheets (Taken 4/13/2023 1957)  Verbalizes/displays adequate comfort level or baseline comfort level: Encourage patient to monitor pain and request assistance     Problem: Safety - Adult  Goal: Free from fall injury  4/14/2023 0253 by Godwin Orellana RN  Outcome: Progressing

## 2023-04-14 NOTE — PROGRESS NOTES
0730-BEDSIDE SHIFT REPORT RECEIVED,PATIENT ASSESSMENT; pulses present to bilateral lower extremitates     0740-patient set up for breakfast     0806-AM MEDS GIVEN    0830-patient receiving bath    0855-MD rounds    0909-patient medicated x 1 for LLE pain    0945-PT/OT present    1030-DC ORDERS NOTED    IV REMOVED  DC INSTRUCTIONS REVIEWED WITH PATIENT  ALL QUESTIONS ANSWERED APPROPRIATLY    1040-PATIENT WHEELED DOWN TO TRANSPORTING VEHICLE W/O DISTRESS.

## 2023-04-14 NOTE — PLAN OF CARE
Problem: ABCDS Injury Assessment  Goal: Absence of physical injury  Outcome: Progressing     Problem: Discharge Planning  Goal: Discharge to home or other facility with appropriate resources  Outcome: Progressing  Flowsheets (Taken 4/13/2023 1957)  Discharge to home or other facility with appropriate resources: Identify barriers to discharge with patient and caregiver     Problem: Pain  Goal: Verbalizes/displays adequate comfort level or baseline comfort level  Outcome: Progressing  Flowsheets (Taken 4/13/2023 1957)  Verbalizes/displays adequate comfort level or baseline comfort level: Encourage patient to monitor pain and request assistance     Problem: Safety - Adult  Goal: Free from fall injury  Outcome: Progressing

## 2023-04-14 NOTE — PROGRESS NOTES
1900 Bedside shift report from  [facility]. Pt states she is in a position of comfort, patient is A&O X4, c/o pain to left knee as an 8/10, pt is walking with minimal assistance and the walker to the bathroom; Safety precautions in place, bed is in the lowest position, bed alarm, yellow socks. Pt states she was able to relax and sleep during the night. No acute events over night. 0700 Bedside shift report given to Cynthia May RN.

## 2023-04-14 NOTE — PROGRESS NOTES
Physical Therapy  Facility/Department: [address]  Daily Treatment Note  NAME: Ewa Kirkland  : 1940  MRN: [id number]    Date of Service: 2023    Discharge Recommendations:  Outpatient PT        Patient Diagnosis(es): Primary osteoarthritis of left knee [M17.12]  Total knee replacement status, left [Z96.652] Total knee replacement status, left    Assessment   Assessment: Pt was sitting in chair upon entrance agreeable to participate in physical therapy. Reviewed HEP with demonstration and verbal teaching to ensure proper form and muscle optimal muscle contraction. Once standing patient ambulated with  ft, mod I. Pt was cued to decrease shuffled gait and to increase heel toe gait to promote a symmetrical gait walking. Focused on knee flexion when walking. Stair navigation was completed using one HR with step to pattern, mod I. STS completed x5 working to fully weight bear through L LE when standing and sitting down. Pt became more confident with each step improving coordination, weight acceptance, and balance. Pt was left sitting in room with all needs met. Pt reported no feelings of lightheadedness, nausea, or dizziness throughout todays session. Activity Tolerance: Patient tolerated treatment well     Restrictions  Restrictions/Precautions  Restrictions/Precautions: Weight Bearing  Lower Extremity Weight Bearing Restrictions  Left Lower Extremity Weight Bearing: Weight Bearing As Tolerated     Subjective    Subjective  Subjective: Pt was seated in chair upon entrance reporting stiffness, tightness, and soreness in L knee.      Objective    Bed Mobility Training  Bed Mobility Training: No  Balance  Sitting: Intact  Standing: Intact  Transfer Training  Transfer Training: Yes  Overall Level of Assistance: Modified independent  Sit to Stand: Modified independent  Stand to Sit: Modified independent     PT Exercises  Exercise Treatment: Ankle pumps 2x10, quad sets 10x5, SLR x10, seated/supine

## 2023-04-14 NOTE — CARE COORDINATION
Case Management Assessment  Initial Evaluation    Date/Time of Evaluation: 4/14/2023 10:09 AM  Assessment Completed by: Mechelle Peralta    If patient is discharged prior to next notation, then this note serves as note for discharge by case management. Patient Name: Olesya Leslie                   YOB: 1940  Diagnosis: Primary osteoarthritis of left knee [M17.12]  Total knee replacement status, left [Z96.652]                   Date / Time: 4/13/2023  6:39 AM    Patient Admission Status: Observation   Readmission Risk (Low < 19, Mod (19-27), High > 27): No data recorded  Current PCP: Baudilio Viera, DO  PCP verified by CM? Chart Reviewed: Yes      History Provided by: Patient  Patient Orientation: Alert and Oriented    Patient Cognition: Alert    Hospitalization in the last 30 days (Readmission):  No    If yes, Readmission Assessment in CM Navigator will be completed. Advance Directives:      Code Status: Full Code   Patient's Primary Decision Maker is:        Discharge Planning:    Patient lives with: Spouse/Significant Other Type of Home: House  Primary Care Giver: Self  Patient Support Systems include: Spouse/Significant Other   Current Financial resources:    Current community resources:    Current services prior to admission: None            Current DME:              Type of Home Care services:  None    ADLS  Prior functional level:    Current functional level:      PT AM-PAC:   /24  OT AM-PAC:   /24    Family can provide assistance at DC: Would you like Case Management to discuss the discharge plan with any other family members/significant others, and if so, who?     Plans to Return to Present Housing:    Other Identified Issues/Barriers to RETURNING to current housing: yes  Potential Assistance needed at discharge: N/A            Potential DME:    Patient expects to discharge to: [address] [place] [address] for transportation at discharge:      Financial    Payor: Nathaniel Lew / Plan: Kj Salazar

## 2023-04-17 ENCOUNTER — HOSPITAL ENCOUNTER (OUTPATIENT)
Dept: PHYSICAL THERAPY | Age: 83
Discharge: HOME | End: 2023-04-17
Payer: MEDICARE

## 2023-04-17 PROCEDURE — 97162 PT EVAL MOD COMPLEX 30 MIN: CPT

## 2023-04-17 PROCEDURE — 97110 THERAPEUTIC EXERCISES: CPT

## 2023-04-19 ENCOUNTER — HOSPITAL ENCOUNTER (OUTPATIENT)
Dept: PHYSICAL THERAPY | Age: 83
Discharge: HOME | End: 2023-04-19
Payer: MEDICARE

## 2023-04-19 ENCOUNTER — TELEPHONE (OUTPATIENT)
Age: 83
End: 2023-04-19

## 2023-04-19 DIAGNOSIS — Z96.652 STATUS POST LEFT KNEE REPLACEMENT: Primary | ICD-10-CM

## 2023-04-19 PROCEDURE — 97110 THERAPEUTIC EXERCISES: CPT

## 2023-04-19 RX ORDER — OXYCODONE HYDROCHLORIDE AND ACETAMINOPHEN 5; 325 MG/1; MG/1
1 TABLET ORAL EVERY 6 HOURS PRN
Qty: 30 TABLET | Refills: 0 | Status: SHIPPED | OUTPATIENT
Start: 2023-04-19 | End: 2023-04-27

## 2023-04-19 NOTE — TELEPHONE ENCOUNTER
Patient had TKA last week and needs a refill on pain medication sent to [facility]

## 2023-04-20 ENCOUNTER — HOSPITAL ENCOUNTER (OUTPATIENT)
Dept: PHYSICAL THERAPY | Age: 83
Discharge: HOME | End: 2023-04-20
Payer: MEDICARE

## 2023-04-20 PROCEDURE — 97110 THERAPEUTIC EXERCISES: CPT

## 2023-04-20 PROCEDURE — 97116 GAIT TRAINING THERAPY: CPT

## 2023-04-24 ENCOUNTER — TELEMEDICINE (OUTPATIENT)
Age: 83
End: 2023-04-24

## 2023-04-24 ENCOUNTER — TELEPHONE (OUTPATIENT)
Age: 83
End: 2023-04-24

## 2023-04-24 ENCOUNTER — HOSPITAL ENCOUNTER (OUTPATIENT)
Dept: PHYSICAL THERAPY | Age: 83
Discharge: HOME | End: 2023-04-24
Payer: MEDICARE

## 2023-04-24 DIAGNOSIS — Z96.652 STATUS POST LEFT KNEE REPLACEMENT: Primary | ICD-10-CM

## 2023-04-24 PROCEDURE — 97116 GAIT TRAINING THERAPY: CPT

## 2023-04-24 PROCEDURE — 99024 POSTOP FOLLOW-UP VISIT: CPT | Performed by: NURSE PRACTITIONER

## 2023-04-24 PROCEDURE — 97110 THERAPEUTIC EXERCISES: CPT

## 2023-04-24 NOTE — PROGRESS NOTES
Subjective:      Patient presents for postop care following left TKA. Surgery was on 4/13/2023. Ambulating  independently . Pain is controlled with current analgesics. Medication(s) being used: acetaminophen, Percocet. .    Objective: There were no vitals taken for this visit. General:  alert, cooperative, no distress, appears stated age   ROM: -5/85; +SLR   Incision:   healing well, no drainage, no erythema, incision well approximated, mild swelling     Assessment:     Doing well postoperatively. Plan:     1. Continue PT. 2. Wound care/showering discussed. 3. Continue DVT prophylaxis as directed. 4. Follow up at 1 yr with Dr. Susan Tolliver for xray's of the left knee and as needed. Shailesh Mitchell, who was evaluated through a synchronous (real-time) audio-video encounter, and/or her healthcare decision maker, is aware that it is a billable service, with coverage as determined by her insurance carrier. She provided verbal consent to proceed: Yes, and patient identification was verified. It was conducted pursuant to the emergency declaration under the [facility], [address] [place] [address] authority and the mobile mum and [employer] General Act. A caregiver was present when appropriate. Ability to conduct physical exam was limited. I was in the office. The patient was at home.

## 2023-04-27 ENCOUNTER — HOSPITAL ENCOUNTER (OUTPATIENT)
Dept: PHYSICAL THERAPY | Age: 83
Discharge: HOME | End: 2023-04-27
Payer: MEDICARE

## 2023-04-27 PROCEDURE — 97116 GAIT TRAINING THERAPY: CPT

## 2023-04-27 PROCEDURE — 97110 THERAPEUTIC EXERCISES: CPT

## 2023-04-28 ENCOUNTER — HOSPITAL ENCOUNTER (OUTPATIENT)
Dept: PHYSICAL THERAPY | Age: 83
Discharge: HOME | End: 2023-04-28
Payer: MEDICARE

## 2023-04-28 PROCEDURE — 97110 THERAPEUTIC EXERCISES: CPT

## 2023-05-01 ENCOUNTER — APPOINTMENT (OUTPATIENT)
Facility: HOSPITAL | Age: 83
End: 2023-05-01
Payer: MEDICARE

## 2023-05-01 ENCOUNTER — HOSPITAL ENCOUNTER (OUTPATIENT)
Dept: PHYSICAL THERAPY | Age: 83
Discharge: HOME | End: 2023-05-01
Payer: MEDICARE

## 2023-05-01 PROCEDURE — 97116 GAIT TRAINING THERAPY: CPT

## 2023-05-01 PROCEDURE — 97110 THERAPEUTIC EXERCISES: CPT

## 2023-05-01 PROCEDURE — 9990 CHARGE CONVERSION

## 2023-05-02 ENCOUNTER — TELEPHONE (OUTPATIENT)
Age: 83
End: 2023-05-02

## 2023-05-02 DIAGNOSIS — Z96.652 STATUS POST LEFT KNEE REPLACEMENT: Primary | ICD-10-CM

## 2023-05-02 RX ORDER — HYDROCODONE BITARTRATE AND ACETAMINOPHEN 5; 325 MG/1; MG/1
1 TABLET ORAL EVERY 8 HOURS PRN
Qty: 30 TABLET | Refills: 0 | Status: SHIPPED | OUTPATIENT
Start: 2023-05-02 | End: 2023-05-11

## 2023-05-04 ENCOUNTER — HOSPITAL ENCOUNTER (OUTPATIENT)
Dept: PHYSICAL THERAPY | Age: 83
Discharge: HOME | End: 2023-05-04
Payer: MEDICARE

## 2023-05-04 PROCEDURE — 97116 GAIT TRAINING THERAPY: CPT

## 2023-05-04 PROCEDURE — 9990 CHARGE CONVERSION

## 2023-05-04 PROCEDURE — 97110 THERAPEUTIC EXERCISES: CPT

## 2023-05-05 ENCOUNTER — HOSPITAL ENCOUNTER (OUTPATIENT)
Dept: PHYSICAL THERAPY | Age: 83
Discharge: HOME | End: 2023-05-05
Payer: MEDICARE

## 2023-05-05 PROCEDURE — 9990 CHARGE CONVERSION

## 2023-05-05 PROCEDURE — 97116 GAIT TRAINING THERAPY: CPT

## 2023-05-05 PROCEDURE — 97110 THERAPEUTIC EXERCISES: CPT

## 2023-05-08 ENCOUNTER — APPOINTMENT (OUTPATIENT)
Dept: PHYSICAL THERAPY | Age: 83
End: 2023-05-08
Payer: MEDICARE

## 2023-05-08 ENCOUNTER — HOSPITAL ENCOUNTER (OUTPATIENT)
Facility: HOSPITAL | Age: 83
Setting detail: RECURRING SERIES
Discharge: HOME | End: 2023-05-11
Payer: MEDICARE

## 2023-05-08 PROCEDURE — 97110 THERAPEUTIC EXERCISES: CPT

## 2023-05-08 PROCEDURE — 97116 GAIT TRAINING THERAPY: CPT

## 2023-05-08 NOTE — PROGRESS NOTES
Therapeutic activity, Self care/home management, Electric stim unattended , Gait training, and Ultrasound  Return visit to MD: 4/24/23    [x]  Plan of care has been reviewed with PTA. The Plan of Care is based on information from the initial evaluation.   [x]  Upgrade activities as tolerated  []  Discharge due to :  []  Other:      Margarito Helton, PT       5/8/2023       7:18 AM

## 2023-05-11 ENCOUNTER — HOSPITAL ENCOUNTER (OUTPATIENT)
Facility: HOSPITAL | Age: 83
Setting detail: RECURRING SERIES
Discharge: HOME | End: 2023-05-14
Payer: MEDICARE

## 2023-05-11 ENCOUNTER — APPOINTMENT (OUTPATIENT)
Dept: PHYSICAL THERAPY | Age: 83
End: 2023-05-11
Payer: MEDICARE

## 2023-05-11 PROCEDURE — 97116 GAIT TRAINING THERAPY: CPT

## 2023-05-11 PROCEDURE — 97110 THERAPEUTIC EXERCISES: CPT

## 2023-05-12 ENCOUNTER — HOSPITAL ENCOUNTER (OUTPATIENT)
Facility: HOSPITAL | Age: 83
Setting detail: RECURRING SERIES
Discharge: HOME | End: 2023-05-15
Payer: MEDICARE

## 2023-05-12 ENCOUNTER — APPOINTMENT (OUTPATIENT)
Dept: PHYSICAL THERAPY | Age: 83
End: 2023-05-12
Payer: MEDICARE

## 2023-05-12 PROCEDURE — 97110 THERAPEUTIC EXERCISES: CPT

## 2023-05-12 PROCEDURE — 97116 GAIT TRAINING THERAPY: CPT

## 2023-05-15 ENCOUNTER — HOSPITAL ENCOUNTER (OUTPATIENT)
Facility: HOSPITAL | Age: 83
Setting detail: RECURRING SERIES
Discharge: HOME | End: 2023-05-18
Payer: MEDICARE

## 2023-05-15 ENCOUNTER — APPOINTMENT (OUTPATIENT)
Dept: PHYSICAL THERAPY | Age: 83
End: 2023-05-15
Payer: MEDICARE

## 2023-05-15 PROCEDURE — 97110 THERAPEUTIC EXERCISES: CPT

## 2023-05-15 PROCEDURE — 97116 GAIT TRAINING THERAPY: CPT

## 2023-05-15 NOTE — PROGRESS NOTES
PHYSICAL THERAPY - MEDICARE DAILY TREATMENT NOTE (updated 3/23)      Date: 5/15/2023          Patient Name:  Ronna Gonzales :  1940   Medical   Diagnosis:  Pain in left knee [M25.562]  Presence of left artificial knee joint [Z96.652] Treatment Diagnosis:  M25.562  LEFT KNEE PAIN    Referral Source:  Mary Burgos MD Insurance:   Payor: Ania Choeuber / Plan: Janene Link / Product Type: *No Product type* /                     Patient  verified yes     Visit #   Current  / Total 13 18   Time   In / Out 3:35pm 4:25pm   Total Treatment Time 50   Total Timed Codes 50   1:1 Treatment Time 48      [facility] Totals Reminder:  bill using total billable   min of TIMED therapeutic procedures and modalities. 8-22 min = 1 unit; 23-37 min = 2 units; 38-52 min = 3 units; 53-67 min = 4 units; 68-82 min = 5 units          SUBJECTIVE    Pain Level (0-10 scale): 4/10    Any medication changes, allergies to medications, adverse drug reactions, diagnosis change, or new procedure performed?: [x] No    [] Yes (see summary sheet for update)  Medications: Verified on Patient Summary List    Subjective functional status/changes:     Pt reports that she is pleased overall with her progress. She sees that she is improving and relying less on the pain medicine. She still has trouble sleeping through the night. \"If I wake up, then I have to get up and put ice on it to ease the pain so I can go back to bed\". She does take the Hydrocodone still at night and to get through the therapy. Therapeutic Procedures: Tx Min Billable or 1:1 Min (if diff from Tx Min) Procedure, Rationale, Specifics   35 07 [id number] Therapeutic Exercise (timed):  increase ROM, strength, coordination, balance, and proprioception to improve patient's ability to progress to PLOF and address remaining functional goals.  (see flow sheet as applicable)     Details if applicable:     15 15 [id number] Gait Training (timed):    400 feet with no

## 2023-05-18 ENCOUNTER — HOSPITAL ENCOUNTER (OUTPATIENT)
Facility: HOSPITAL | Age: 83
Setting detail: RECURRING SERIES
Discharge: HOME | End: 2023-05-21
Payer: MEDICARE

## 2023-05-18 ENCOUNTER — APPOINTMENT (OUTPATIENT)
Dept: PHYSICAL THERAPY | Age: 83
End: 2023-05-18
Payer: MEDICARE

## 2023-05-18 PROCEDURE — 97110 THERAPEUTIC EXERCISES: CPT

## 2023-05-18 PROCEDURE — 97116 GAIT TRAINING THERAPY: CPT

## 2023-05-19 ENCOUNTER — APPOINTMENT (OUTPATIENT)
Dept: PHYSICAL THERAPY | Age: 83
End: 2023-05-19
Payer: MEDICARE

## 2023-05-19 ENCOUNTER — HOSPITAL ENCOUNTER (OUTPATIENT)
Facility: HOSPITAL | Age: 83
Setting detail: RECURRING SERIES
Discharge: HOME | End: 2023-05-22
Payer: MEDICARE

## 2023-05-19 PROCEDURE — 97110 THERAPEUTIC EXERCISES: CPT

## 2023-05-22 ENCOUNTER — HOSPITAL ENCOUNTER (OUTPATIENT)
Facility: HOSPITAL | Age: 83
Setting detail: RECURRING SERIES
Discharge: HOME | End: 2023-05-25
Payer: MEDICARE

## 2023-05-22 PROCEDURE — 97116 GAIT TRAINING THERAPY: CPT

## 2023-05-22 PROCEDURE — 97110 THERAPEUTIC EXERCISES: CPT

## 2023-05-22 NOTE — PROGRESS NOTES
PHYSICAL THERAPY - MEDICARE DAILY TREATMENT NOTE (updated 3/23)      Date: 2023          Patient Name:  Deja Ardon :  1940   Medical   Diagnosis:  Pain in left knee [M25.562]  Presence of left artificial knee joint [Z96.652] Treatment Diagnosis:  M25.562  LEFT KNEE PAIN    Referral Source:  Micah Ureña MD Insurance:   Payor: Vallorie Hands / Plan: Justo Heater / Product Type: *No Product type* /                     Patient  verified yes     Visit #   Current  / Total 16 18   Time   In / Out 03:45pm 4:45 pm   Total Treatment Time 55 min   Total Timed Codes 55 min   1:1 Treatment Time 55 min      [facility] Totals Reminder:  bill using total billable   min of TIMED therapeutic procedures and modalities. 8-22 min = 1 unit; 23-37 min = 2 units; 38-52 min = 3 units; 53-67 min = 4 units; 68-82 min = 5 units          SUBJECTIVE    Pain Level (0-10 scale): 2/10    Any medication changes, allergies to medications, adverse drug reactions, diagnosis change, or new procedure performed?:   [x] No    [] Yes (see summary sheet for update)  Medications: Verified on Patient Summary List    Subjective functional status/changes:     Pt is doing very well. She still takes the Hydrocodone to come into therapy and a half of one for night time. She walked at a [employer] for about 10 minutes yesterday,  but the pavement was uneven and so she doesn't feel like she \"did as good as before\". She was able to get the whole bandaging off without difficulty. \"It looks good, but I've still got this rash\". Therapeutic Procedures: Tx Min Billable or 1:1 Min (if diff from Tx Min) Procedure, Rationale, Specifics   45 25 [id number] Therapeutic Exercise (timed):  increase ROM, strength, coordination, balance, and proprioception to improve patient's ability to progress to PLOF and address remaining functional goals.  (see flow sheet as applicable)     Details if applicable:     10 10 [id number] Gait Training (timed):

## 2023-05-24 ENCOUNTER — HOSPITAL ENCOUNTER (OUTPATIENT)
Facility: HOSPITAL | Age: 83
Setting detail: RECURRING SERIES
Discharge: HOME | End: 2023-05-27
Payer: MEDICARE

## 2023-05-24 PROCEDURE — 97110 THERAPEUTIC EXERCISES: CPT

## 2023-05-24 NOTE — PROGRESS NOTES
PHYSICAL THERAPY - MEDICARE DAILY TREATMENT NOTE (updated 3/23)      Date: 2023          Patient Name:  Sebastien Nash :  1940   Medical   Diagnosis:  Pain in left knee [M25.562]  Presence of left artificial knee joint [Z96.652] Treatment Diagnosis:  M25.562  LEFT KNEE PAIN    Referral Source:  Tony Jacobs MD Insurance:   Payor: Jung Nurse / Plan: [employer] / Product Type: *No Product type* /                     Patient  verified yes     Visit #   Current  / Total 17 18   Time   In / Out 02:30 pm 03:16 pm   Total Treatment Time 45 min   Total Timed Codes 45 min   1:1 Treatment Time 45 min      [facility] Totals Reminder:  bill using total billable   min of TIMED therapeutic procedures and modalities. 8-22 min = 1 unit; 23-37 min = 2 units; 38-52 min = 3 units; 53-67 min = 4 units; 68-82 min = 5 units          SUBJECTIVE    Pain Level (0-10 scale): 0/10    Any medication changes, allergies to medications, adverse drug reactions, diagnosis change, or new procedure performed?:   [x] No    [] Yes (see summary sheet for update)  Medications: Verified on Patient Summary List    Subjective functional status/changes:     Pt stated she did not have any pain today and thinks she is doing better. \"I thought I would be further along by now. \"      Therapeutic Procedures: Tx Min Billable or 1:1 Min (if diff from Tx Min) Procedure, Rationale, Specifics   40 40 [id number] Therapeutic Exercise (timed):  increase ROM, strength, coordination, balance, and proprioception to improve patient's ability to progress to PLOF and address remaining functional goals. (see flow sheet as applicable)     Details if applicable:      82321 Gait Training (timed):    Treadmill for 5 min at 1.4MPH and stair training with less use of hands for safe advancement of gait and stairs without imbalance.     Details if applicable:          45 45    Total Total       [x]  Patient Education billed concurrently with other

## 2023-05-25 ENCOUNTER — HOSPITAL ENCOUNTER (OUTPATIENT)
Facility: HOSPITAL | Age: 83
Setting detail: RECURRING SERIES
Discharge: HOME | End: 2023-05-28
Payer: MEDICARE

## 2023-05-25 PROCEDURE — 97110 THERAPEUTIC EXERCISES: CPT

## 2023-05-25 PROCEDURE — 97116 GAIT TRAINING THERAPY: CPT

## 2023-05-26 ENCOUNTER — TELEPHONE (OUTPATIENT)
Age: 83
End: 2023-05-26

## 2023-05-26 DIAGNOSIS — Z96.652 STATUS POST LEFT KNEE REPLACEMENT: Primary | ICD-10-CM

## 2023-05-26 RX ORDER — TRIAMCINOLONE ACETONIDE 1 MG/G
CREAM TOPICAL
Qty: 15 G | Refills: 1 | Status: SHIPPED | OUTPATIENT
Start: 2023-05-26

## 2023-05-26 NOTE — TELEPHONE ENCOUNTER
Spoke with patient who reported a red, itchy rash near her incisional scar. Advised that I will send in a prescription for a rash cream, and also that she might try a non-drowsy OTC antihistamine.

## (undated) DEVICE — SUTURE VCRL SZ 3-0 L27IN ABSRB UD L24MM PS-1 3/8 CIR PRIM J936H

## (undated) DEVICE — SYSTEM SKIN CLSR 60CM 2-OCTYL CYNOACRLT W/ MESH DISPNS

## (undated) DEVICE — DRESSING ANTIMIC FOAM OPTIFOAM POSTOP ADH 4X14 IN

## (undated) DEVICE — INTENDED FOR TISSUE SEPARATION, AND OTHER PROCEDURES THAT REQUIRE A SHARP SURGICAL BLADE TO PUNCTURE OR CUT.: Brand: BARD-PARKER SAFETY BLADES SIZE 10, STERILE

## (undated) DEVICE — GLOVE SURG SZ 65 L12IN FNGR THK79MIL GRN LTX FREE

## (undated) DEVICE — STRYKER PERFORMANCE SERIES SAGITTAL BLADE: Brand: STRYKER PERFORMANCE SERIES

## (undated) DEVICE — GLOVE SURG UNDERGLOVE 7.5 PF BLU BIOGEL PI MIC LF

## (undated) DEVICE — GLOVE ORANGE PI 8 1/2   MSG9085

## (undated) DEVICE — BASIC SINGLE BASIN-LF: Brand: MEDLINE INDUSTRIES, INC.

## (undated) DEVICE — 3M™ IOBAN™ 2 ANTIMICROBIAL INCISE DRAPE 6650EZ: Brand: IOBAN™ 2

## (undated) DEVICE — GOWN,AURORA,FABRIC-REINFORCED,X-LARGE: Brand: MEDLINE

## (undated) DEVICE — COVER,TABLE,HEAVY DUTY,77"X90",STRL: Brand: MEDLINE

## (undated) DEVICE — GLOVE ORANGE PI 8   MSG9080

## (undated) DEVICE — TOTAL KNEE PACK: Brand: MEDLINE INDUSTRIES, INC.

## (undated) DEVICE — BANDAGE COBAN 4 IN COMPR W4INXL5YD FOAM COHESIVE QUIK STK SELF ADH SFT

## (undated) DEVICE — ATTUNE SOLO PINNING SYSTEM

## (undated) DEVICE — 450 ML BOTTLE OF 0.05% CHLORHEXIDINE GLUCONATE IN 99.95% STERILE WATER FOR IRRIGATION, USP AND APPLICATOR.: Brand: IRRISEPT ANTIMICROBIAL WOUND LAVAGE

## (undated) DEVICE — GOWN,SIRUS,NONRNF,XLN/XL,20/CS: Brand: MEDLINE

## (undated) DEVICE — SUTURE VCRL + SZ 2-0 L27IN ABSRB UD CT-2 L26MM 1/2 CIR TAPR VCP269H

## (undated) DEVICE — SPONGE LAP W18XL18IN WHT COT 4 PLY FLD STRUNG RADPQ DISP ST 2 PER PACK

## (undated) DEVICE — TOWEL,OR,DSP,ST,BLUE,STD,4/PK,20PK/CS: Brand: MEDLINE

## (undated) DEVICE — GARMENT COMPR M FOR 13IN FT INTMIT SGL BLDR HEM FORC II

## (undated) DEVICE — 4-PORT MANIFOLD: Brand: NEPTUNE 2

## (undated) DEVICE — BNDG,ELSTC,MATRIX,STRL,6"X5YD,LF,HOOK&LP: Brand: MEDLINE

## (undated) DEVICE — BOWL MIXING VAC PALABOWL PALACOS

## (undated) DEVICE — CUFF REPROC TRNQT DPSB W/PLC BROWN 34IN

## (undated) DEVICE — ELECTRODE PT RET AD L9FT HI MOIST COND ADH HYDRGEL CORDED

## (undated) DEVICE — HOOD WITH PEEL AWAY FACE SHIELD: Brand: T7PLUS

## (undated) DEVICE — SOLUTION IRRIG 500ML 0.9% SOD CHLO USP POUR PLAS BTL

## (undated) DEVICE — BLADE SAW W12.5XL70MM THK1MM RECIP DBL SIDE OFFSET

## (undated) DEVICE — APPLICATOR MEDICATED 26 CC SOLUTION HI LT ORNG CHLORAPREP

## (undated) DEVICE — HANDPIECE SET WITH HIGH FLOW TIP AND SUCTION TUBE: Brand: INTERPULSE

## (undated) DEVICE — GLOVE ORANGE PI 7   MSG9070

## (undated) DEVICE — SUTURE VCRL + SZ 0 L27IN ANTIBACTERIAL POLYGLACTIN 910 W VCP280H

## (undated) DEVICE — 3M™ TEGADERM™ HP TRANSPARENT FILM DRESSING FRAME STYLE, 9546HP, 4 IN X 4-1/2 IN (10 CM X 11.5 CM), 50/CT 4CT/CASE: Brand: 3M™ TEGADERM™

## (undated) DEVICE — DRAPE,TOP,102X53,STERILE: Brand: MEDLINE

## (undated) DEVICE — ELECTRODE BLDE L6.5IN CAUT EXT DISP

## (undated) DEVICE — SHEET,DRAPE,70X100,STERILE: Brand: MEDLINE

## (undated) DEVICE — GLOVE SURG 7 BIOGEL PI ULTRATOUCH G

## (undated) DEVICE — HYPODERMIC SAFETY NEEDLE: Brand: MAGELLAN

## (undated) DEVICE — GLOVE SURG SZ 75 L12IN FNGR THK79MIL GRN LTX FREE

## (undated) DEVICE — SUTURE VCRL + SZ 1 L27IN ANTIBACTERIAL POLYGLACTIN 910 W VCP281H

## (undated) DEVICE — GLOVE SURG SZ 65 THK91MIL LTX FREE SYN POLYISOPRENE